# Patient Record
Sex: FEMALE | Race: BLACK OR AFRICAN AMERICAN | NOT HISPANIC OR LATINO | Employment: UNEMPLOYED | URBAN - METROPOLITAN AREA
[De-identification: names, ages, dates, MRNs, and addresses within clinical notes are randomized per-mention and may not be internally consistent; named-entity substitution may affect disease eponyms.]

---

## 2022-09-06 ENCOUNTER — OFFICE VISIT (OUTPATIENT)
Dept: URGENT CARE | Facility: CLINIC | Age: 42
End: 2022-09-06
Payer: MEDICAID

## 2022-09-06 VITALS
BODY MASS INDEX: 24.77 KG/M2 | WEIGHT: 118 LBS | SYSTOLIC BLOOD PRESSURE: 121 MMHG | DIASTOLIC BLOOD PRESSURE: 84 MMHG | RESPIRATION RATE: 18 BRPM | TEMPERATURE: 98 F | OXYGEN SATURATION: 99 % | HEIGHT: 58 IN | HEART RATE: 68 BPM

## 2022-09-06 DIAGNOSIS — J06.9 URI, ACUTE: ICD-10-CM

## 2022-09-06 DIAGNOSIS — R05.9 COUGH: Primary | ICD-10-CM

## 2022-09-06 DIAGNOSIS — R11.0 NAUSEA: ICD-10-CM

## 2022-09-06 LAB
CTP QC/QA: YES
SARS-COV-2 RDRP RESP QL NAA+PROBE: NEGATIVE

## 2022-09-06 PROCEDURE — U0002 COVID-19 LAB TEST NON-CDC: HCPCS | Mod: QW,S$GLB,, | Performed by: FAMILY MEDICINE

## 2022-09-06 PROCEDURE — U0002: ICD-10-PCS | Mod: QW,S$GLB,, | Performed by: FAMILY MEDICINE

## 2022-09-06 PROCEDURE — 99203 OFFICE O/P NEW LOW 30 MIN: CPT | Mod: S$GLB,,, | Performed by: FAMILY MEDICINE

## 2022-09-06 PROCEDURE — 99203 PR OFFICE/OUTPT VISIT, NEW, LEVL III, 30-44 MIN: ICD-10-PCS | Mod: S$GLB,,, | Performed by: FAMILY MEDICINE

## 2022-09-06 RX ORDER — LORATADINE 10 MG/1
10 TABLET ORAL DAILY
Qty: 30 TABLET | Refills: 2 | Status: SHIPPED | OUTPATIENT
Start: 2022-09-06 | End: 2023-06-15

## 2022-09-06 NOTE — PROGRESS NOTES
"Subjective:       Patient ID: Yesenia Sanches is a 41 y.o. female.    Vitals:  height is 4' 10" (1.473 m) and weight is 53.5 kg (118 lb). Her temperature is 98.1 °F (36.7 °C). Her blood pressure is 121/84 and her pulse is 68. Her respiration is 18 and oxygen saturation is 99%.     Chief Complaint: Cough    Patient presents to the clinic with nausea , cough, headache x a few days  Denies fever, but feels ache,  Partially vaccinated  Mild cough  Works at Day care      Cough  This is a new problem. The current episode started in the past 7 days. The problem has been gradually worsening. The cough is Productive of sputum. Associated symptoms include nasal congestion, postnasal drip, a sore throat and sweats. Treatments tried: halls, mucinex, tylenol. The treatment provided mild relief.     HENT:  Positive for postnasal drip and sore throat.    Respiratory:  Positive for cough.      Objective:      Physical Exam   Constitutional: She is oriented to person, place, and time. She appears well-developed. She is cooperative.  Non-toxic appearance. She does not appear ill. No distress.   HENT:   Head: Normocephalic and atraumatic.   Ears:   Right Ear: Hearing, tympanic membrane, external ear and ear canal normal.   Left Ear: Hearing, tympanic membrane, external ear and ear canal normal.   Nose: Nose normal. No mucosal edema, rhinorrhea or nasal deformity. No epistaxis. Right sinus exhibits no maxillary sinus tenderness and no frontal sinus tenderness. Left sinus exhibits no maxillary sinus tenderness and no frontal sinus tenderness.   Mouth/Throat: Uvula is midline and mucous membranes are normal. Mucous membranes are moist. No trismus in the jaw. Normal dentition. No uvula swelling. Posterior oropharyngeal erythema present. No oropharyngeal exudate. Oropharynx is clear.   Eyes: Conjunctivae and lids are normal. Pupils are equal, round, and reactive to light. Right eye exhibits no discharge. Left eye exhibits no discharge. " No scleral icterus. Extraocular movement intact   Neck: Trachea normal and phonation normal. Neck supple.   Cardiovascular: Normal rate, regular rhythm, normal heart sounds and normal pulses.   Pulmonary/Chest: Effort normal and breath sounds normal. No respiratory distress.   Abdominal: Normal appearance and bowel sounds are normal. She exhibits no distension and no mass. Soft. There is no abdominal tenderness.   Musculoskeletal: Normal range of motion.         General: No deformity. Normal range of motion.   Neurological: She is alert and oriented to person, place, and time. She exhibits normal muscle tone. Coordination normal.   Skin: Skin is warm, dry, intact, not diaphoretic and not pale.   Psychiatric: Her speech is normal and behavior is normal. Judgment and thought content normal.   Nursing note and vitals reviewed.      Assessment:       1. Cough    2. Nausea    3. URI, acute            Plan:         Cough  -     POCT COVID-19 Rapid Screening    Nausea    URI, acute    Other orders  -     loratadine (CLARITIN) 10 mg tablet; Take 1 tablet (10 mg total) by mouth once daily.  Dispense: 30 tablet; Refill: 2              Results for orders placed or performed in visit on 09/06/22   POCT COVID-19 Rapid Screening   Result Value Ref Range    POC Rapid COVID Negative Negative     Acceptable Yes

## 2022-09-06 NOTE — LETTER
September 6, 2022      [facility]  [address] [address]  Phone: [phone]  Fax: [phone]       Patient: eYsenia Sanches   YOB: 1980  Date of Visit: 09/06/2022    To Whom It May Concern:    DAVI Sanches  was at [facility] on 09/06/2022. The patient may return to work/school on 9/7/22   with no restrictions. If you have any questions or concerns, or if I can be of further assistance, please do not hesitate to contact me.    Sincerely,    Ty Greer MD

## 2023-03-23 ENCOUNTER — HOSPITAL ENCOUNTER (EMERGENCY)
Facility: HOSPITAL | Age: 43
Discharge: HOME | End: 2023-03-23
Attending: EMERGENCY MEDICINE
Payer: MEDICAID

## 2023-03-23 VITALS
SYSTOLIC BLOOD PRESSURE: 126 MMHG | WEIGHT: 130 LBS | OXYGEN SATURATION: 100 % | BODY MASS INDEX: 27.17 KG/M2 | RESPIRATION RATE: 16 BRPM | TEMPERATURE: 98 F | DIASTOLIC BLOOD PRESSURE: 82 MMHG | HEART RATE: 88 BPM

## 2023-03-23 DIAGNOSIS — N63.41 SUBAREOLAR MASS OF RIGHT BREAST: Primary | ICD-10-CM

## 2023-03-23 PROCEDURE — 99283 PR EMERGENCY DEPT VISIT,LEVEL III: ICD-10-PCS | Mod: ,,, | Performed by: EMERGENCY MEDICINE

## 2023-03-23 PROCEDURE — 99283 EMERGENCY DEPT VISIT LOW MDM: CPT | Mod: ,,, | Performed by: EMERGENCY MEDICINE

## 2023-03-23 PROCEDURE — 99281 EMR DPT VST MAYX REQ PHY/QHP: CPT

## 2023-03-23 NOTE — ED TRIAGE NOTES
"Pt arriving to ED with c/o right breast tenderness since Monday, when showering, felt a lump. Had mammogram 1.5 years ago, had "ring put in to watch a benign mass". States does not get tenderness with menstruation. Denies breastfeeding.   "

## 2023-03-23 NOTE — ED PROVIDER NOTES
"Encounter Date: 3/23/2023    SCRIBE #1 NOTE: I, Marilu Caicedo, am scribing for, and in the presence of,  Kamlesh Brock MD. I have scribed the following portions of the note - Other sections scribed: HPI, ROS, PE.   STAFF ATTENDING PHYSICIAN NOTE:  I provided and agree with the documentation provided by KEVIN on Yesenia Sanches.  ____________________  Satish Brock MD, [facility]  Emergency Medicine  History     Chief Complaint   Patient presents with    Breast Pain     Right side pain, when showering, felt a lump. Had mammogram 1.5 years ago, had "ring put in to watch a benign mass"       Time patient was seen by the provider: 11:30 AM      The patient is a 42 y.o. female with no significant past medical history who presents to the ED with a complaint of intermittent right breast tenderness today. Pt reports her pain is reproducible with touch and aggravated with movement. 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 Pt has had a mammogram approximately a year ago and was found to have a right breast mass. She has since been biopsied for this at OSH. She is currently being followed for this. However the pt presents anxious and concerned as she suspects this is worsening. Denies galactorrhea or sanguineous discharge. Pt does not have a FHx of breast cancer.    The history is provided by medical records and the patient. No  was used.   Review of patient's allergies indicates:  No Known Allergies  History reviewed. No pertinent past medical history.  History reviewed. No pertinent surgical history.  History reviewed. No pertinent family history.  Social History     Tobacco Use    Smoking status: Never    Smokeless tobacco: Never   Substance Use Topics    Alcohol use: No    Drug use: No     Review of Systems   Constitutional:  Negative for fever.   HENT:  Negative for sore throat.    Eyes:  Negative for visual disturbance.   Respiratory:  Negative for cough and shortness of " breath.    Cardiovascular:  Negative for chest pain.   Gastrointestinal:  Negative for abdominal pain, diarrhea, nausea and vomiting.   Genitourinary:  Negative for dysuria.   Musculoskeletal:  Negative for neck pain.        + R breast tenderness   Skin:  Negative for rash and wound.   Allergic/Immunologic: Negative for immunocompromised state.   Neurological:  Negative for syncope.   Psychiatric/Behavioral:  Negative for confusion. The patient is nervous/anxious.      Physical Exam     Initial Vitals [03/23/23 1013]   BP Pulse Resp Temp SpO2   126/82 101 16 98.4 °F (36.9 °C) 100 %      MAP       --         Physical Exam    Nursing note and vitals reviewed.    GENERAL: Calm; Cooperative; Well-appearing and Non-Toxic; Well-Nourished; NAD.  HEENT: AT/NC; anicteric sclera; speaking full sentences with no slurring of speech or drooling/inability to tolerate oral secretions.  NECK: Supple, FROM with no meningismus, no accessory muscle use.   THORAX/BACK: Atraumatic with NTTP. No midline TTP to C/T/LS spine; no nipple discharge, areolar retraction with right breast subareolar rubber inconsistency and slightly smaller than golf ball size mass that is not adhere to the anterior chest wall and easily movable.  HEART: Regular rate and rhythm, no M/G/T.  LUNGS: No Tachypnea, No Retractions, and CTA B/L with no W/R/R.  ABDOMEN: Soft, ND, NTTP. No rigidity. No guarding. NEG Andrew's, Rovsing's, or McBurney's point tenderness.  EXTREMITIES/LYMPHADENOPATHY: FROM.  No appreciated axillary or supraclavicular lymphadenopathy bilaterally.  SKIN: Warm, Dry, No Skin Tears or Rashes.  VASCULAR: 2+ pulses Prox/Dist & Symmetrical with No delay.  NEUROLOGIC: AAOx3; answering questions appropriately with no focal deficits    ED Course   Procedures  Labs Reviewed - No data to display       Imaging Results    None          Medications - No data to display  Medical Decision Making:   History:   Old Medical Records: I decided to obtain old  medical records.  Initial Assessment:   Afebrile, atraumatic and hemodynamically stable female presents with right subareolar mass warranting outpatient sonography follow-up.  Acute ED interventions warranted at this time.  No signs that would indicate acute infection that may need antibiotic treatment.        Scribe Attestation:   Scribe #1: I performed the above scribed service and the documentation accurately describes the services I performed. I attest to the accuracy of the note.                   Clinical Impression:   Final diagnoses:  [N63.41] Subareolar mass of right breast (Primary)        ED Disposition Condition    Discharge Stable          ED Prescriptions    None       Follow-up Information    None          Kamlesh Brock MD  04/01/23 [id number]

## 2023-03-31 ENCOUNTER — OFFICE VISIT (OUTPATIENT)
Dept: URGENT CARE | Facility: CLINIC | Age: 43
End: 2023-03-31
Payer: MEDICAID

## 2023-03-31 VITALS
BODY MASS INDEX: 27.17 KG/M2 | TEMPERATURE: 98 F | SYSTOLIC BLOOD PRESSURE: 122 MMHG | WEIGHT: 130 LBS | DIASTOLIC BLOOD PRESSURE: 83 MMHG | OXYGEN SATURATION: 96 % | RESPIRATION RATE: 20 BRPM | HEART RATE: 75 BPM

## 2023-03-31 DIAGNOSIS — R00.2 PALPITATIONS: Primary | ICD-10-CM

## 2023-03-31 PROCEDURE — 93010 ELECTROCARDIOGRAM REPORT: CPT | Mod: S$PBB,,, | Performed by: INTERNAL MEDICINE

## 2023-03-31 PROCEDURE — 93010 EKG 12-LEAD: ICD-10-PCS | Mod: S$PBB,,, | Performed by: INTERNAL MEDICINE

## 2023-03-31 PROCEDURE — 99213 OFFICE O/P EST LOW 20 MIN: CPT | Mod: S$GLB,,, | Performed by: NURSE PRACTITIONER

## 2023-03-31 PROCEDURE — 99213 PR OFFICE/OUTPT VISIT, EST, LEVL III, 20-29 MIN: ICD-10-PCS | Mod: S$GLB,,, | Performed by: NURSE PRACTITIONER

## 2023-03-31 PROCEDURE — 93005 EKG 12-LEAD: ICD-10-PCS | Mod: S$GLB,,, | Performed by: NURSE PRACTITIONER

## 2023-03-31 PROCEDURE — 93005 ELECTROCARDIOGRAM TRACING: CPT | Mod: S$GLB,,, | Performed by: NURSE PRACTITIONER

## 2023-03-31 NOTE — LETTER
March 31, 2023      [facility] - [place]  [address]  [facility] [address]  Phone: [phone]  Fax: [phone]       Patient: Yesenia Sanches   YOB: 1980  Date of Visit: 03/31/2023    To Whom It May Concern:    DAVI Sanches  was at [facility] on 03/31/2023. The patient may return to work/school on 04/04/2023 with no restrictions. If you have any questions or concerns, or if I can be of further assistance, please do not hesitate to contact me.    Sincerely,    Magno Fountain MA

## 2023-03-31 NOTE — PROGRESS NOTES
"Subjective:      Patient ID: Yesenia Sanches is a 42 y.o. female.    Vitals:  weight is 59 kg (130 lb). Her temperature is 98.3 °F (36.8 °C). Her blood pressure is 122/83 and her pulse is 75. Her respiration is 20 and oxygen saturation is 96%.     Chief Complaint: Tachycardia    Pt is complaining of heart racing that started last night. It kept her up for a good bit of the night but she was able to fall asleep.  She had another episode in the day but resolved. No accompanying pedal edema, chest pain, SOB, orthopnea.  Does feel like she was breathing heavy at the time of palpitations. Also has some tingling in the extremities that resolved spontaneouslyIn the am, she felt a little dizzy, a little "off" but no LOC, syncope.  No hx of card or pulm pathologies. Denies new meds. No caffeine intake  Review or EHR , mammogram done Jan 2021, which noted a subareolar mass in the R breast. Core biopsy done and noted that it was a benign fibroadenoma  Seen by OB 3/27, repeat mammo and US breast showed chronic breast mass with changes with recommendation for repeat bx    She said she took aspirin because she thought she was having a heart attack. She is just worried because she feels her hurt pulsating.       Palpitations   This is a new problem. The current episode started today. Nothing aggravates the symptoms. Associated symptoms include dizziness and nausea. Pertinent negatives include no chest fullness, chest pain, coughing, diaphoresis, fever, irregular heartbeat, near-syncope, numbness, shortness of breath, syncope, vomiting or weakness. There are no known risk factors.     Constitution: Negative for chills, sweating, fatigue and fever.   HENT:  Negative for ear pain, ear discharge, tinnitus, hearing loss, congestion, sinus pain, sinus pressure, sore throat, trouble swallowing and voice change.    Neck: Negative for neck pain and painful lymph nodes.   Cardiovascular:  Positive for palpitations. Negative for chest " pain, leg swelling, sob on exertion and passing out.   Respiratory:  Negative for chest tightness, cough, sputum production, shortness of breath and wheezing.    Gastrointestinal:  Positive for nausea. Negative for abdominal pain, vomiting, constipation, diarrhea, bright red blood in stool, dark colored stools and rectal bleeding.   Genitourinary:  Negative for dysuria, frequency, urgency, flank pain, hematuria, vaginal pain, vaginal discharge, vaginal bleeding, vaginal odor, genital sore and pelvic pain.   Musculoskeletal:  Negative for muscle ache.   Skin:  Negative for color change, pale, rash and erythema.   Allergic/Immunologic: Negative for sneezing.   Neurological:  Positive for dizziness. Negative for headaches, numbness and tingling.   Hematologic/Lymphatic: Negative for swollen lymph nodes.    Objective:     Physical Exam   Constitutional: She is oriented to person, place, and time. She appears well-developed. She is cooperative.  Non-toxic appearance. She does not appear ill. No distress.   HENT:   Head: Normocephalic and atraumatic.   Ears:   Right Ear: Hearing and external ear normal.   Left Ear: Hearing and external ear normal.   Nose: Nose normal. No mucosal edema, rhinorrhea, nasal deformity or congestion. No epistaxis. Right sinus exhibits no maxillary sinus tenderness and no frontal sinus tenderness. Left sinus exhibits no maxillary sinus tenderness and no frontal sinus tenderness.   Mouth/Throat: Uvula is midline, oropharynx is clear and moist and mucous membranes are normal. No trismus in the jaw. Normal dentition. No uvula swelling. No posterior oropharyngeal edema.   Eyes: Conjunctivae and lids are normal. Right eye exhibits no discharge. No scleral icterus.   Neck: Trachea normal and phonation normal. Neck supple. Carotid bruit is not present. No edema present. No erythema present. No neck rigidity present.   Cardiovascular: Normal rate, regular rhythm, normal heart sounds and normal pulses.    No murmur heard.  Pulmonary/Chest: Effort normal and breath sounds normal. No stridor. No respiratory distress. She has no decreased breath sounds. She has no wheezes. She has no rhonchi. She has no rales. She exhibits no tenderness.   Abdominal: Normal appearance and bowel sounds are normal. She exhibits no distension, no abdominal bruit, no pulsatile midline mass and no mass. Soft. flat abdomen There is no abdominal tenderness. There is no rebound and no guarding.   Musculoskeletal: Normal range of motion.         General: No swelling, tenderness, deformity or signs of injury. Normal range of motion.      Cervical back: She exhibits no tenderness.      Right lower leg: No edema.      Left lower leg: No edema.   Lymphadenopathy:     She has no cervical adenopathy.   Neurological: no focal deficit. She is alert and oriented to person, place, and time. She has normal strength and normal reflexes. She displays no weakness and normal reflexes. No sensory deficit. She exhibits normal muscle tone. Coordination and gait normal.   Skin: Skin is warm, dry, intact, not diaphoretic, not pale and no rash. Capillary refill takes 2 to 3 seconds. No bruising and No erythema   Psychiatric: Her speech is normal and behavior is normal. Judgment and thought content normal.   Nursing note and vitals reviewed.    EKG - NSR  Assessment:     1. Palpitations      Differentials include but not limited to anxiety, cardiac arrhythmia, dehydration, anemia, metabolic d/o.  Patient VSS and at baseline, currently symptom free. Recommend avoidance of caffeine, follow heart healthy lifestyle, stress reduction techniques and f/u with PCP for further evaluation and management. ER precaution discussed.   Plan:       Palpitations  -     IN OFFICE EKG 12-LEAD (to [place])                  Patient Instructions   See additional patient Instructions provided    Patient Instructions   - You must understand that you have received an Urgent Care treatment  only and that you may be released before all of your medical problems are known or treated.   - You, the patient, will arrange for follow up care as instructed.   - If your condition worsens or fails to improve we recommend that you receive another evaluation at the ER immediately or contact your PCP to discuss your concerns or return here.     Advised on return/follow-up precautions. Advised on ER precautions. Answered all patient questions. Patient verbalized understanding and voiced agreement with current treatment plan.

## 2023-06-12 ENCOUNTER — HOSPITAL ENCOUNTER (EMERGENCY)
Facility: HOSPITAL | Age: 43
Discharge: HOME | End: 2023-06-13
Attending: EMERGENCY MEDICINE
Payer: MEDICAID

## 2023-06-12 DIAGNOSIS — R00.2 PALPITATIONS: Primary | ICD-10-CM

## 2023-06-12 LAB
B-HCG UR QL: NEGATIVE
CTP QC/QA: YES

## 2023-06-12 PROCEDURE — 99284 EMERGENCY DEPT VISIT MOD MDM: CPT | Mod: ,,, | Performed by: EMERGENCY MEDICINE

## 2023-06-12 PROCEDURE — 99284 PR EMERGENCY DEPT VISIT,LEVEL IV: ICD-10-PCS | Mod: ,,, | Performed by: EMERGENCY MEDICINE

## 2023-06-12 PROCEDURE — 93005 ELECTROCARDIOGRAM TRACING: CPT

## 2023-06-12 PROCEDURE — 93010 EKG 12-LEAD: ICD-10-PCS | Mod: ,,, | Performed by: INTERNAL MEDICINE

## 2023-06-12 PROCEDURE — 93010 ELECTROCARDIOGRAM REPORT: CPT | Mod: ,,, | Performed by: INTERNAL MEDICINE

## 2023-06-12 PROCEDURE — 99284 EMERGENCY DEPT VISIT MOD MDM: CPT

## 2023-06-12 PROCEDURE — 81025 URINE PREGNANCY TEST: CPT | Performed by: PHYSICIAN ASSISTANT

## 2023-06-12 RX ORDER — IBUPROFEN 200 MG
400 TABLET ORAL DAILY PRN
COMMUNITY

## 2023-06-12 RX ORDER — OMEPRAZOLE 20 MG/1
20 CAPSULE, DELAYED RELEASE ORAL DAILY PRN
COMMUNITY

## 2023-06-12 RX ORDER — LANOLIN ALCOHOL/MO/W.PET/CERES
1000 CREAM (GRAM) TOPICAL
COMMUNITY

## 2023-06-12 NOTE — Clinical Note
"Cyn BARNETTSATNAM aSnches was seen and treated in our emergency department on 6/12/2023.  She may return to work on 06/15/2023.       If you have any questions or concerns, please don't hesitate to call.      RONAL Pedraza RN RN    "

## 2023-06-12 NOTE — Clinical Note
"Cyn BARNETTSATNAM Sanches was seen and treated in our emergency department on 6/12/2023.  She may return to work on 06/15/2023.       If you have any questions or concerns, please don't hesitate to call.      RONAL Pedraza RN RN    "

## 2023-06-13 VITALS
DIASTOLIC BLOOD PRESSURE: 82 MMHG | BODY MASS INDEX: 25.89 KG/M2 | HEART RATE: 70 BPM | TEMPERATURE: 98 F | SYSTOLIC BLOOD PRESSURE: 130 MMHG | RESPIRATION RATE: 18 BRPM | OXYGEN SATURATION: 100 % | WEIGHT: 120 LBS | HEIGHT: 57 IN

## 2023-06-13 LAB
ALBUMIN SERPL BCP-MCNC: 3.8 G/DL (ref 3.5–5.2)
ALP SERPL-CCNC: 56 U/L (ref 55–135)
ALT SERPL W/O P-5'-P-CCNC: 8 U/L (ref 10–44)
ANION GAP SERPL CALC-SCNC: 6 MMOL/L (ref 8–16)
AST SERPL-CCNC: 11 U/L (ref 10–40)
BASOPHILS # BLD AUTO: 0.03 K/UL (ref 0–0.2)
BASOPHILS NFR BLD: 0.5 % (ref 0–1.9)
BILIRUB SERPL-MCNC: 0.8 MG/DL (ref 0.1–1)
BUN SERPL-MCNC: 13 MG/DL (ref 6–20)
BUN SERPL-MCNC: 15 MG/DL (ref 6–30)
CALCIUM SERPL-MCNC: 8.9 MG/DL (ref 8.7–10.5)
CHLORIDE SERPL-SCNC: 101 MMOL/L (ref 95–110)
CHLORIDE SERPL-SCNC: 106 MMOL/L (ref 95–110)
CO2 SERPL-SCNC: 27 MMOL/L (ref 23–29)
CREAT SERPL-MCNC: 0.6 MG/DL (ref 0.5–1.4)
CREAT SERPL-MCNC: 0.7 MG/DL (ref 0.5–1.4)
DIFFERENTIAL METHOD: ABNORMAL
EOSINOPHIL # BLD AUTO: 0.1 K/UL (ref 0–0.5)
EOSINOPHIL NFR BLD: 1.3 % (ref 0–8)
ERYTHROCYTE [DISTWIDTH] IN BLOOD BY AUTOMATED COUNT: 15.6 % (ref 11.5–14.5)
EST. GFR  (NO RACE VARIABLE): >60 ML/MIN/1.73 M^2
GLUCOSE SERPL-MCNC: 93 MG/DL (ref 70–110)
GLUCOSE SERPL-MCNC: 98 MG/DL (ref 70–110)
HCT VFR BLD AUTO: 36.4 % (ref 37–48.5)
HCT VFR BLD CALC: 39 %PCV (ref 36–54)
HCV AB SERPL QL IA: NORMAL
HGB BLD-MCNC: 11.2 G/DL (ref 12–16)
HIV 1+2 AB+HIV1 P24 AG SERPL QL IA: NORMAL
IMM GRANULOCYTES # BLD AUTO: 0.01 K/UL (ref 0–0.04)
IMM GRANULOCYTES NFR BLD AUTO: 0.2 % (ref 0–0.5)
LYMPHOCYTES # BLD AUTO: 3.2 K/UL (ref 1–4.8)
LYMPHOCYTES NFR BLD: 49.8 % (ref 18–48)
MAGNESIUM SERPL-MCNC: 1.8 MG/DL (ref 1.6–2.6)
MCH RBC QN AUTO: 21.4 PG (ref 27–31)
MCHC RBC AUTO-ENTMCNC: 30.8 G/DL (ref 32–36)
MCV RBC AUTO: 70 FL (ref 82–98)
MONOCYTES # BLD AUTO: 0.8 K/UL (ref 0.3–1)
MONOCYTES NFR BLD: 12.8 % (ref 4–15)
NEUTROPHILS # BLD AUTO: 2.3 K/UL (ref 1.8–7.7)
NEUTROPHILS NFR BLD: 35.4 % (ref 38–73)
NRBC BLD-RTO: 0 /100 WBC
PLATELET # BLD AUTO: 286 K/UL (ref 150–450)
PMV BLD AUTO: 10 FL (ref 9.2–12.9)
POC IONIZED CALCIUM: 1.24 MMOL/L (ref 1.06–1.42)
POC TCO2 (MEASURED): 28 MMOL/L (ref 23–29)
POTASSIUM BLD-SCNC: 3.9 MMOL/L (ref 3.5–5.1)
POTASSIUM SERPL-SCNC: 4 MMOL/L (ref 3.5–5.1)
PROT SERPL-MCNC: 6.7 G/DL (ref 6–8.4)
RBC # BLD AUTO: 5.23 M/UL (ref 4–5.4)
SAMPLE: NORMAL
SARS-COV-2 RDRP RESP QL NAA+PROBE: NEGATIVE
SODIUM BLD-SCNC: 138 MMOL/L (ref 136–145)
SODIUM SERPL-SCNC: 139 MMOL/L (ref 136–145)
TSH SERPL DL<=0.005 MIU/L-ACNC: 1.15 UIU/ML (ref 0.4–4)
WBC # BLD AUTO: 6.35 K/UL (ref 3.9–12.7)

## 2023-06-13 PROCEDURE — 80047 BASIC METABLC PNL IONIZED CA: CPT

## 2023-06-13 PROCEDURE — 86803 HEPATITIS C AB TEST: CPT | Performed by: EMERGENCY MEDICINE

## 2023-06-13 PROCEDURE — 87389 HIV-1 AG W/HIV-1&-2 AB AG IA: CPT | Performed by: EMERGENCY MEDICINE

## 2023-06-13 PROCEDURE — 83735 ASSAY OF MAGNESIUM: CPT | Performed by: PHYSICIAN ASSISTANT

## 2023-06-13 PROCEDURE — U0002 COVID-19 LAB TEST NON-CDC: HCPCS | Performed by: PHYSICIAN ASSISTANT

## 2023-06-13 PROCEDURE — 84443 ASSAY THYROID STIM HORMONE: CPT | Performed by: PHYSICIAN ASSISTANT

## 2023-06-13 PROCEDURE — 80053 COMPREHEN METABOLIC PANEL: CPT | Performed by: PHYSICIAN ASSISTANT

## 2023-06-13 PROCEDURE — 85025 COMPLETE CBC W/AUTO DIFF WBC: CPT | Performed by: PHYSICIAN ASSISTANT

## 2023-06-13 NOTE — PROVIDER PROGRESS NOTES - EMERGENCY DEPT.
Encounter Date: 6/12/2023    ED Physician Progress Notes         EKG - STEMI Decision  Initial Reading: No STEMI present.  Response: No Action Needed.

## 2023-06-13 NOTE — PROVIDER PROGRESS NOTES - EMERGENCY DEPT.
Encounter Date: 6/12/2023    ED Physician Progress Notes        Received sign-out from previous team plan was follow-up blood work and reassess.  Labs reviewed.  Workup greatly reassuring no significant electrolyte abnormality EKG normal sinus rhythm TSH within normal limits.  Discussed with patient diagnosis of palpitations.  She will be discharged with referral for Cardiology Holter monitor.  Patient does endorse she is under lot of stress could be stress-induced versus underlying arrhythmia.  Will plan discharge home return precautions discussed.

## 2023-06-13 NOTE — ED PROVIDER NOTES
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  She reports sensation of skipped heartbeats with associated fatigue. Her symptoms seem worse after eating.  She denies chest pain, shortness of breath, abdominal pain, nausea/vomiting, hot or cold intolerance, leg pain or swelling or prior similar episodes.  No history DVT/PE, no recent surgeries or immobilization.  She did have a recent breast biopsy for a tick focal ductal hyperplasia and reports that the path was negative for cancer. Not on OCPs.    Review of patient's allergies indicates:  No Known Allergies  History reviewed. No pertinent past medical history.  History reviewed. No pertinent surgical history.  History reviewed. No pertinent family history.  Social History     Tobacco Use    Smoking status: Never    Smokeless tobacco: Never   Substance Use Topics    Alcohol use: No    Drug use: No     Review of Systems   Constitutional:  Positive for fatigue. Negative for fever.   Respiratory:  Negative for shortness of breath.    Cardiovascular:  Positive for palpitations. Negative for chest pain and leg swelling.   Gastrointestinal:  Negative for abdominal pain, diarrhea, nausea and vomiting.   Endocrine: Negative for cold intolerance and heat intolerance.   Allergic/Immunologic: Negative for immunocompromised state.   Psychiatric/Behavioral:  The patient is not nervous/anxious.      Physical Exam     Initial Vitals [06/12/23 2237]   BP Pulse Resp Temp SpO2   131/81 72 18 98.7 °F (37.1 °C) 100 %      MAP       --         Physical Exam    Nursing note and vitals reviewed.  Constitutional: She appears well-developed and well-nourished.   HENT:   Head: Normocephalic and atraumatic.   Eyes:   Pale conjunctivae   Neck: Neck  supple.   Normal range of motion.  Cardiovascular:  Normal rate, regular rhythm, normal heart sounds and intact distal pulses.     Exam reveals no gallop and no friction rub.       No murmur heard.  No lower extremity edema, erythema, tenderness or warmth   Pulmonary/Chest: Breath sounds normal. No respiratory distress. She has no wheezes. She has no rhonchi. She has no rales. She exhibits no tenderness.   Musculoskeletal:         General: Normal range of motion.      Cervical back: Normal range of motion and neck supple.     Neurological: She is alert and oriented to person, place, and time.   Skin: Skin is warm and dry.   Psychiatric: She has a normal mood and affect.       ED Course   Procedures  Labs Reviewed   CBC W/ AUTO DIFFERENTIAL - Abnormal; Notable for the following components:       Result Value    Hemoglobin 11.2 (*)     Hematocrit 36.4 (*)     MCV 70 (*)     MCH 21.4 (*)     MCHC 30.8 (*)     RDW 15.6 (*)     Gran % 35.4 (*)     Lymph % 49.8 (*)     All other components within normal limits   COMPREHENSIVE METABOLIC PANEL   TSH   MAGNESIUM   SARS-COV-2 RNA AMPLIFICATION, QUAL   HIV 1 / 2 ANTIBODY   HEPATITIS C ANTIBODY   POCT URINE PREGNANCY   ISTAT PROCEDURE   ISTAT CHEM8     EKG Readings: (Independently Interpreted)   Initial Reading: No STEMI. Previous EKG: Compared with most recent EKG Previous EKG Date: 3/31/2023. Rhythm: Normal Sinus Rhythm. Heart Rate: 84. Ectopy: No Ectopy. Conduction: Normal. ST Segments: Normal ST Segments. Clinical Impression: Normal Sinus Rhythm Other Impression: Incomplete right bundle branch block, similar compared to prior     Imaging Results    None          Medications - No data to display  Medical Decision Making:   History:   Old Medical Records: I decided to obtain old medical records.  Old Records Summarized: records from clinic visits and records from another hospital.       <> Summary of Records: Seen for follow-up after her breast biopsy 05/10/2023.  Pathology  consistent with atypical ductal hyperplasia.  Initial Assessment:   42-year-old female presenting for intermittent palpitations for 3 days.  Her vitals are normal and she is well-appearing.  Differential Diagnosis:   Dysrhythmia   Anemia  Electrolyte derangement  Thyroid dysfunction   She has no chest pain, shortness of breath or lightheadedness.  I do not think her presentation is consistent with ACS.  I did consider pulmonary embolism but I do not think her presentation is consistent with this.  She has no chest pain, shortness of breath or syncope and is PERC negative  Independently Interpreted Test(s):   I have ordered and independently interpreted EKG Reading(s) - see prior notes  Clinical Tests:   Lab Tests: Ordered and Reviewed  Medical Tests: Ordered and Reviewed  ED Management:  Check labs, EKG, place on cardiac monitor and reassess.    Dispo pending results of lab workup.  I am signing out to Matilda Helms MD.    1:08 AM  Diane Fonseca PA-C    Additional MDM:   PERC Rule:   Age is greater than or equal to 50 = 0.0  Heart Rate is greater than or equal to 100 = 0.0  SaO2 on room air < 95% = 0.0  Unilateral leg swelling = 0.0  Hemoptysis = 0.0  Recent surgery or trauma = 0.0  Prior PE or DVT =  0.0  Hormone use = 0.00  PERC Score = 0        ED Course as of 06/13/23 0109 Mon Jun 12, 2023   2354 Preg Test, Ur: Negative [CC]   Tue Jun 13, 2023   0041 Hemoglobin(!): 11.2 [CC]      ED Course User Index  [CC] Diane Fonseca PA-C                 Clinical Impression:   Final diagnoses:  [R00.2] Palpitations (Primary)               Diane Fonseca PA-C  06/13/23 0109

## 2023-06-13 NOTE — ED NOTES
LOC: The patient is awake, alert, and oriented to self, place, time, and situation. Pt is calm and cooperative. Affect is appropriate.  Speech is appropriate and clear.     APPEARANCE: Patient resting comfortably in no acute distress.  Patient is clean and well groomed.    SKIN: The skin is warm and dry; color consistent with ethnicity.  Patient has normal skin turgor and moist mucus membranes.  Skin intact; no breakdown or bruising noted.     MUSCULOSKELETAL: Patient moving upper and lower extremities without difficulty; denies pain in the extremities or back.  Denies weakness.     RESPIRATORY: Airway is open and patent. Respirations spontaneous, even, easy, and non-labored.  No audible wheeze. Patient has a normal effort and rate.  No accessory muscle use noted. Denies cough.     CARDIAC:  Normal rate noted.  No peripheral edema noted.  No complaints of chest pain.      ABDOMEN:    No distention noted. Pt denies abdominal pain; denies nausea, vomiting, diarrhea, or constipation.    NEUROLOGIC: Eyes open spontaneously.  Behavior appropriate to situation.  Follows commands; facial expression symmetrical.  Purposeful motor response noted; normal sensation in all extremities. Pt denies headache; denies lightheadedness or dizziness; denies visual disturbances; denies loss of balance; denies unilateral weakness.

## 2023-06-13 NOTE — ED TRIAGE NOTES
Yesenia SARWAT Sanches, a 42 y.o. female presents to the ED w/ complaint of palpitations. Reports this has been happening for weeks but it's getting worse and she would like to be checked out. Reports it feels like her heart stops for a second and then starts beating again. This happens intermittently throughout the day. Denies fever, chest pain, shortness of breath, nausea, vomiting.    Triage note:  Chief Complaint   Patient presents with    Palpitations     States heart feels like its skipping a beat intermittently x 3 days     Review of patient's allergies indicates:  No Known Allergies  History reviewed. No pertinent past medical history.

## 2023-06-15 ENCOUNTER — OFFICE VISIT (OUTPATIENT)
Dept: CARDIOLOGY | Facility: CLINIC | Age: 43
End: 2023-06-15
Payer: MEDICAID

## 2023-06-15 VITALS
HEART RATE: 67 BPM | DIASTOLIC BLOOD PRESSURE: 80 MMHG | BODY MASS INDEX: 27.59 KG/M2 | WEIGHT: 127.88 LBS | OXYGEN SATURATION: 99 % | SYSTOLIC BLOOD PRESSURE: 124 MMHG | HEIGHT: 57 IN

## 2023-06-15 DIAGNOSIS — R00.2 PALPITATIONS: ICD-10-CM

## 2023-06-15 DIAGNOSIS — R94.31 NONSPECIFIC ABNORMAL ELECTROCARDIOGRAM (ECG) (EKG): ICD-10-CM

## 2023-06-15 PROCEDURE — 3079F DIAST BP 80-89 MM HG: CPT | Mod: CPTII,,, | Performed by: INTERNAL MEDICINE

## 2023-06-15 PROCEDURE — 1160F RVW MEDS BY RX/DR IN RCRD: CPT | Mod: CPTII,,, | Performed by: INTERNAL MEDICINE

## 2023-06-15 PROCEDURE — 3079F PR MOST RECENT DIASTOLIC BLOOD PRESSURE 80-89 MM HG: ICD-10-PCS | Mod: CPTII,,, | Performed by: INTERNAL MEDICINE

## 2023-06-15 PROCEDURE — 3008F PR BODY MASS INDEX (BMI) DOCUMENTED: ICD-10-PCS | Mod: CPTII,,, | Performed by: INTERNAL MEDICINE

## 2023-06-15 PROCEDURE — 99203 PR OFFICE/OUTPT VISIT, NEW, LEVL III, 30-44 MIN: ICD-10-PCS | Mod: S$PBB,,, | Performed by: INTERNAL MEDICINE

## 2023-06-15 PROCEDURE — 3008F BODY MASS INDEX DOCD: CPT | Mod: CPTII,,, | Performed by: INTERNAL MEDICINE

## 2023-06-15 PROCEDURE — 99999 PR PBB SHADOW E&M-EST. PATIENT-LVL III: ICD-10-PCS | Mod: PBBFAC,,, | Performed by: INTERNAL MEDICINE

## 2023-06-15 PROCEDURE — 99999 PR PBB SHADOW E&M-EST. PATIENT-LVL III: CPT | Mod: PBBFAC,,, | Performed by: INTERNAL MEDICINE

## 2023-06-15 PROCEDURE — 1160F PR REVIEW ALL MEDS BY PRESCRIBER/CLIN PHARMACIST DOCUMENTED: ICD-10-PCS | Mod: CPTII,,, | Performed by: INTERNAL MEDICINE

## 2023-06-15 PROCEDURE — 1159F PR MEDICATION LIST DOCUMENTED IN MEDICAL RECORD: ICD-10-PCS | Mod: CPTII,,, | Performed by: INTERNAL MEDICINE

## 2023-06-15 PROCEDURE — 99203 OFFICE O/P NEW LOW 30 MIN: CPT | Mod: S$PBB,,, | Performed by: INTERNAL MEDICINE

## 2023-06-15 PROCEDURE — 99213 OFFICE O/P EST LOW 20 MIN: CPT | Mod: PBBFAC,PN | Performed by: INTERNAL MEDICINE

## 2023-06-15 PROCEDURE — 3074F PR MOST RECENT SYSTOLIC BLOOD PRESSURE < 130 MM HG: ICD-10-PCS | Mod: CPTII,,, | Performed by: INTERNAL MEDICINE

## 2023-06-15 PROCEDURE — 1159F MED LIST DOCD IN RCRD: CPT | Mod: CPTII,,, | Performed by: INTERNAL MEDICINE

## 2023-06-15 PROCEDURE — 3074F SYST BP LT 130 MM HG: CPT | Mod: CPTII,,, | Performed by: INTERNAL MEDICINE

## 2023-06-15 NOTE — PROGRESS NOTES
[facility] - Cardiology [address]  Cardiology Clinic Note      Chief Complaint  Chief Complaint   Patient presents with    Palpitations     Started last week, intermittent.  ED 06/12/2023        HPI:    42-year-old female seen recently for palpitations in the emergency room  Labs and EKG reviewed  Patient reports palpitations over the past week occurring intermittently.  She states the palpitations are as if her heart is skipping a beat.  Reports no significant associated cardiovascular symptoms.      Medications  Current Outpatient Medications   Medication Sig Dispense Refill    cyanocobalamin (VITAMIN B-12) 1000 MCG tablet Take 1,000 mcg by mouth.      ibuprofen (ADVIL,MOTRIN) 200 MG tablet Take 400 mg by mouth daily as needed.      omeprazole (PRILOSEC) 20 MG capsule Take 20 mg by mouth daily as needed.       No current facility-administered medications for this visit.        History  No past medical history on file.  No past surgical history on file.  Social History     Socioeconomic History    Marital status: Single   Tobacco Use    Smoking status: Never    Smokeless tobacco: Never   Substance and Sexual Activity    Alcohol use: No    Drug use: No     Family History   Problem Relation Age of Onset    Hypertension Mother     Heart disease Father     Stroke Father     Hypertension Father         Allergies  Review of patient's allergies indicates:  No Known Allergies    Review of Systems   Review of Systems   Constitutional: Negative for fever.   HENT:  Negative for nosebleeds.    Eyes:  Negative for visual disturbance.   Cardiovascular:  Negative for chest pain, claudication, dyspnea on exertion, palpitations and syncope.   Respiratory:  Negative for cough, hemoptysis and wheezing.    Endocrine: Negative for cold intolerance, heat intolerance, polyphagia and polyuria.   Hematologic/Lymphatic: Negative for bleeding problem.   Skin:  Negative for rash.   Musculoskeletal:  Negative for myalgias.   Gastrointestinal:   Negative for hematemesis, hematochezia, nausea and vomiting.   Genitourinary:  Negative for dysuria.   Neurological:  Negative for focal weakness and sensory change.   Psychiatric/Behavioral:  Negative for altered mental status.      Physical Exam  Vitals:    06/15/23 0927   BP: 124/80   Pulse: 67     Wt Readings from Last 1 Encounters:   06/15/23 58 kg (127 lb 13.9 oz)     Physical Exam  Constitutional:       General: She is not in acute distress.  HENT:      Head: Normocephalic and atraumatic.      Mouth/Throat:      Mouth: Mucous membranes are moist.   Eyes:      Extraocular Movements: Extraocular movements intact.      Pupils: Pupils are equal, round, and reactive to light.   Neck:      Vascular: No carotid bruit or JVD.   Cardiovascular:      Rate and Rhythm: Normal rate and regular rhythm.      Heart sounds: No murmur heard.    No friction rub. No gallop.   Pulmonary:      Effort: Pulmonary effort is normal.      Breath sounds: Normal breath sounds.   Abdominal:      Tenderness: There is no abdominal tenderness. There is no guarding or rebound.   Musculoskeletal:      Right lower leg: No edema.      Left lower leg: No edema.   Skin:     General: Skin is warm and dry.      Capillary Refill: Capillary refill takes less than 2 seconds.   Neurological:      General: No focal deficit present.      Mental Status: She is alert.   Psychiatric:         Mood and Affect: Mood normal.       Labs  Admission on 06/12/2023, Discharged on 06/13/2023   Component Date Value Ref Range Status    POC Preg Test, Ur 06/12/2023 Negative  Negative Final     Acceptable 06/12/2023 Yes   Final    WBC 06/13/2023 6.35  3.90 - 12.70 K/uL Final    RBC 06/13/2023 5.23  4.00 - 5.40 M/uL Final    Hemoglobin 06/13/2023 11.2 (L)  12.0 - 16.0 g/dL Final    Hematocrit 06/13/2023 36.4 (L)  37.0 - 48.5 % Final    MCV 06/13/2023 70 (L)  82 - 98 fL Final    MCH 06/13/2023 21.4 (L)  27.0 - 31.0 pg Final    MCHC 06/13/2023 30.8 (L)  32.0 -  36.0 g/dL Final    RDW 06/13/2023 15.6 (H)  11.5 - 14.5 % Final    Platelets 06/13/2023 286  150 - 450 K/uL Final    MPV 06/13/2023 10.0  9.2 - 12.9 fL Final    Immature Granulocytes 06/13/2023 0.2  0.0 - 0.5 % Final    Gran # (ANC) 06/13/2023 2.3  1.8 - 7.7 K/uL Final    Immature Grans (Abs) 06/13/2023 0.01  0.00 - 0.04 K/uL Final    Comment: Mild elevation in immature granulocytes is non specific and   can be seen in a variety of conditions including stress response,   acute inflammation, trauma and pregnancy. Correlation with other   laboratory and clinical findings is essential.      Lymph # 06/13/2023 3.2  1.0 - 4.8 K/uL Final    Mono # 06/13/2023 0.8  0.3 - 1.0 K/uL Final    Eos # 06/13/2023 0.1  0.0 - 0.5 K/uL Final    Baso # 06/13/2023 0.03  0.00 - 0.20 K/uL Final    nRBC 06/13/2023 0  0 /100 WBC Final    Gran % 06/13/2023 35.4 (L)  38.0 - 73.0 % Final    Lymph % 06/13/2023 49.8 (H)  18.0 - 48.0 % Final    Mono % 06/13/2023 12.8  4.0 - 15.0 % Final    Eosinophil % 06/13/2023 1.3  0.0 - 8.0 % Final    Basophil % 06/13/2023 0.5  0.0 - 1.9 % Final    Differential Method 06/13/2023 Automated   Final    Sodium 06/13/2023 139  136 - 145 mmol/L Final    Potassium 06/13/2023 4.0  3.5 - 5.1 mmol/L Final    Chloride 06/13/2023 106  95 - 110 mmol/L Final    CO2 06/13/2023 27  23 - 29 mmol/L Final    Glucose 06/13/2023 93  70 - 110 mg/dL Final    BUN 06/13/2023 13  6 - 20 mg/dL Final    Creatinine 06/13/2023 0.7  0.5 - 1.4 mg/dL Final    Calcium 06/13/2023 8.9  8.7 - 10.5 mg/dL Final    Total Protein 06/13/2023 6.7  6.0 - 8.4 g/dL Final    Albumin 06/13/2023 3.8  3.5 - 5.2 g/dL Final    Total Bilirubin 06/13/2023 0.8  0.1 - 1.0 mg/dL Final    Comment: For infants and newborns, interpretation of results should be based  on gestational age, weight and in agreement with clinical  observations.    Premature Infant recommended reference ranges:  Up to 24 hours.............<8.0 mg/dL  Up to 48 hours............<12.0  mg/dL  3-5 days..................<15.0 mg/dL  6-29 days.................<15.0 mg/dL      Alkaline Phosphatase 06/13/2023 56  55 - 135 U/L Final    AST 06/13/2023 11  10 - 40 U/L Final    ALT 06/13/2023 8 (L)  10 - 44 U/L Final    Anion Gap 06/13/2023 6 (L)  8 - 16 mmol/L Final    eGFR 06/13/2023 >60.0  >60 mL/min/1.73 m^2 Final    TSH 06/13/2023 1.150  0.400 - 4.000 uIU/mL Final    Magnesium 06/13/2023 1.8  1.6 - 2.6 mg/dL Final    SARS-CoV-2 RNA, Amplification, Qual 06/13/2023 Negative  Negative Final    Comment: This test utilizes isothermal nucleic acid amplification technology   to   detect the SARS-CoV-2 RdRp nucleic acid segment. The analytical   sensitivity   (limit of detection) is 500 copies/swab.     A POSITIVE result is indicative of the presence of SARS-CoV-2 RNA;   clinical   correlation with patient history and other diagnostic information is   necessary to determine patient infection status.    A NEGATIVE result means that SARS-CoV-2 nucleic acids are not present   above   the limit of detection. A NEGATIVE result should be treated as   presumptive.   It does not rule out the possibility of COVID-19 and should not be   the sole   basis for treatment decisions. If COVID-19 is strongly suspected   based on   clinical and exposure history, re-testing using an alternate   molecular assay   should be considered.     This test is only for use under the Food and Drug Administration s   Emergency   Use Authorization (EUA).     Commercial kits are provided by [employer]. [employer]                           e   characteristics of the EUA have been independently verified by   [facility] Department of Pathology and Laboratory Medicine.   _________________________________________________________________   The authorized Fact Sheet for Healthcare Providers and the authorized   Fact   Sheet for Patients of the ID NOW COVID-19 are available on the FDA   website:    https://www.fda.gov/media/050441/download   https://www.fda.gov/media/730946/download      HIV 1/2 Ag/Ab 06/13/2023 Non-reactive  Non-reactive Final    Hepatitis C Ab 06/13/2023 Non-reactive  Non-reactive Final    POC Glucose 06/13/2023 98  70 - 110 mg/dL Final    POC BUN 06/13/2023 15  6 - 30 mg/dL Final    POC Creatinine 06/13/2023 0.6  0.5 - 1.4 mg/dL Final    POC Sodium 06/13/2023 138  136 - 145 mmol/L Final    POC Potassium 06/13/2023 3.9  3.5 - 5.1 mmol/L Final    POC Chloride 06/13/2023 101  95 - 110 mmol/L Final    POC TCO2 (MEASURED) 06/13/2023 28  23 - 29 mmol/L Final    POC Ionized Calcium 06/13/2023 1.24  1.06 - 1.42 mmol/L Final    POC Hematocrit 06/13/2023 39  36 - 54 %PCV Final    Sample 06/13/2023 MARCELO   Final       EKG  06/12/2023 normal sinus rhythm normal rate incomplete right bundle-branch block low-voltage precordial leads    Echo   No results found for this or any previous visit.    Imaging  No results found.    Prior coronary angiogram / intervention:  N/A    Assessment and Plan  1. Palpitations  - Holter monitor - 48 hour; Future    2. Nonspecific abnormal electrocardiogram (ECG) (EKG)  Asymptomatic  - Echo; Future    Follow Up  Follow up in about 6 months (around 12/15/2023).      Aries Blackwell MD, FACC, RPVI  Interventional Cardiology

## 2023-06-16 ENCOUNTER — HOSPITAL ENCOUNTER (OUTPATIENT)
Dept: CARDIOLOGY | Facility: HOSPITAL | Age: 43
Discharge: HOME | End: 2023-06-16
Attending: INTERNAL MEDICINE
Payer: MEDICAID

## 2023-06-16 VITALS — WEIGHT: 127 LBS | HEIGHT: 59 IN | BODY MASS INDEX: 25.6 KG/M2

## 2023-06-16 DIAGNOSIS — R94.31 NONSPECIFIC ABNORMAL ELECTROCARDIOGRAM (ECG) (EKG): ICD-10-CM

## 2023-06-16 DIAGNOSIS — R00.2 PALPITATIONS: ICD-10-CM

## 2023-06-16 PROCEDURE — 93225 XTRNL ECG REC<48 HRS REC: CPT | Mod: PO

## 2023-06-16 PROCEDURE — 93227 XTRNL ECG REC<48 HR R&I: CPT | Mod: ,,, | Performed by: INTERNAL MEDICINE

## 2023-06-16 PROCEDURE — 93306 TTE W/DOPPLER COMPLETE: CPT | Mod: 26,,, | Performed by: INTERNAL MEDICINE

## 2023-06-16 PROCEDURE — 93306 ECHO (CUPID ONLY): ICD-10-PCS | Mod: 26,,, | Performed by: INTERNAL MEDICINE

## 2023-06-16 PROCEDURE — 93306 TTE W/DOPPLER COMPLETE: CPT | Mod: PO

## 2023-06-16 PROCEDURE — 93227 HOLTER MONITOR - 48 HOUR (CUPID ONLY): ICD-10-PCS | Mod: ,,, | Performed by: INTERNAL MEDICINE

## 2023-06-18 LAB
AORTIC ROOT ANNULUS: 2.21 CM
AORTIC VALVE CUSP SEPERATION: 1.62 CM
AV INDEX (PROSTH): 0.68
AV MEAN GRADIENT: 4 MMHG
AV PEAK GRADIENT: 9 MMHG
AV VALVE AREA: 1.81 CM2
AV VELOCITY RATIO: 0.64
BSA FOR ECHO PROCEDURE: 1.55 M2
CV ECHO LV RWT: 0.39 CM
DOP CALC AO PEAK VEL: 1.52 M/S
DOP CALC AO VTI: 35.7 CM
DOP CALC LVOT AREA: 2.7 CM2
DOP CALC LVOT DIAMETER: 1.85 CM
DOP CALC LVOT PEAK VEL: 0.98 M/S
DOP CALC LVOT STROKE VOLUME: 64.75 CM3
DOP CALC MV VTI: 19.2 CM
DOP CALCLVOT PEAK VEL VTI: 24.1 CM
E WAVE DECELERATION TIME: 144.64 MSEC
E/A RATIO: 0.87
ECHO LV POSTERIOR WALL: 0.85 CM (ref 0.6–1.1)
EJECTION FRACTION: 55 %
FRACTIONAL SHORTENING: 33 % (ref 28–44)
INTERVENTRICULAR SEPTUM: 0.98 CM (ref 0.6–1.1)
IVC DIAMETER: 1.54 CM
LA MAJOR: 4.1 CM
LA MINOR: 3.6 CM
LA WIDTH: 3.3 CM
LEFT ATRIUM SIZE: 2.94 CM
LEFT ATRIUM VOLUME INDEX MOD: 17.7 ML/M2
LEFT ATRIUM VOLUME INDEX: 20.8 ML/M2
LEFT ATRIUM VOLUME MOD: 26.86 CM3
LEFT ATRIUM VOLUME: 31.62 CM3
LEFT INTERNAL DIMENSION IN SYSTOLE: 2.92 CM (ref 2.1–4)
LEFT VENTRICLE DIASTOLIC VOLUME INDEX: 56 ML/M2
LEFT VENTRICLE DIASTOLIC VOLUME: 85.12 ML
LEFT VENTRICLE MASS INDEX: 84 G/M2
LEFT VENTRICLE SYSTOLIC VOLUME INDEX: 21.6 ML/M2
LEFT VENTRICLE SYSTOLIC VOLUME: 32.89 ML
LEFT VENTRICULAR INTERNAL DIMENSION IN DIASTOLE: 4.34 CM (ref 3.5–6)
LEFT VENTRICULAR MASS: 128.01 G
LVOT MG: 2.12 MMHG
LVOT MV: 0.68 CM/S
MV A" WAVE DURATION": 104.66 MSEC
MV MEAN GRADIENT: 3 MMHG
MV PEAK A VEL: 1.07 M/S
MV PEAK E VEL: 0.93 M/S
MV PEAK GRADIENT: 5 MMHG
MV STENOSIS PRESSURE HALF TIME: 41.95 MS
MV VALVE AREA BY CONTINUITY EQUATION: 3.37 CM2
MV VALVE AREA P 1/2 METHOD: 5.24 CM2
OHS LV EJECTION FRACTION SIMPSONS BIPLANE MOD: 6 %
PISA TR MAX VEL: 2.36 M/S
PULM VEIN S/D RATIO: 0.9
PV MV: 0.56 M/S
PV PEAK D VEL: 0.5 M/S
PV PEAK S VEL: 0.45 M/S
PV PEAK VELOCITY: 0.77 CM/S
RA MAJOR: 3.49 CM
RA PRESSURE: 3 MMHG
RIGHT VENTRICULAR END-DIASTOLIC DIMENSION: 2.01 CM
STJ: 2.27 CM
TR MAX PG: 22 MMHG
TV REST PULMONARY ARTERY PRESSURE: 25 MMHG

## 2023-06-19 ENCOUNTER — OFFICE VISIT (OUTPATIENT)
Dept: FAMILY MEDICINE | Facility: HOSPITAL | Age: 43
End: 2023-06-19
Payer: MEDICAID

## 2023-06-19 ENCOUNTER — LAB VISIT (OUTPATIENT)
Dept: LAB | Facility: HOSPITAL | Age: 43
End: 2023-06-19
Payer: MEDICAID

## 2023-06-19 VITALS
DIASTOLIC BLOOD PRESSURE: 81 MMHG | WEIGHT: 129.44 LBS | BODY MASS INDEX: 26.09 KG/M2 | HEIGHT: 59 IN | SYSTOLIC BLOOD PRESSURE: 120 MMHG | HEART RATE: 60 BPM

## 2023-06-19 DIAGNOSIS — Z13.220 SCREENING, LIPID: ICD-10-CM

## 2023-06-19 DIAGNOSIS — D64.9 ANEMIA, UNSPECIFIED TYPE: ICD-10-CM

## 2023-06-19 DIAGNOSIS — Z13.1 SCREENING FOR DIABETES MELLITUS (DM): ICD-10-CM

## 2023-06-19 DIAGNOSIS — K21.9 GASTROESOPHAGEAL REFLUX DISEASE WITHOUT ESOPHAGITIS: ICD-10-CM

## 2023-06-19 DIAGNOSIS — K21.9 GASTROESOPHAGEAL REFLUX DISEASE WITHOUT ESOPHAGITIS: Primary | ICD-10-CM

## 2023-06-19 LAB
CHOLEST SERPL-MCNC: 155 MG/DL (ref 120–199)
CHOLEST/HDLC SERPL: 1.9 {RATIO} (ref 2–5)
ESTIMATED AVG GLUCOSE: 103 MG/DL (ref 68–131)
FERRITIN SERPL-MCNC: 33 NG/ML (ref 20–300)
HBA1C MFR BLD: 5.2 % (ref 4–5.6)
HDLC SERPL-MCNC: 80 MG/DL (ref 40–75)
HDLC SERPL: 51.6 % (ref 20–50)
IRON SERPL-MCNC: 56 UG/DL (ref 30–160)
LDLC SERPL CALC-MCNC: 65 MG/DL (ref 63–159)
NONHDLC SERPL-MCNC: 75 MG/DL
SATURATED IRON: 15 % (ref 20–50)
TOTAL IRON BINDING CAPACITY: 363 UG/DL (ref 250–450)
TRANSFERRIN SERPL-MCNC: 245 MG/DL (ref 200–375)
TRIGL SERPL-MCNC: 50 MG/DL (ref 30–150)

## 2023-06-19 PROCEDURE — 84466 ASSAY OF TRANSFERRIN: CPT | Performed by: STUDENT IN AN ORGANIZED HEALTH CARE EDUCATION/TRAINING PROGRAM

## 2023-06-19 PROCEDURE — 82728 ASSAY OF FERRITIN: CPT | Performed by: STUDENT IN AN ORGANIZED HEALTH CARE EDUCATION/TRAINING PROGRAM

## 2023-06-19 PROCEDURE — 86677 HELICOBACTER PYLORI ANTIBODY: CPT | Performed by: STUDENT IN AN ORGANIZED HEALTH CARE EDUCATION/TRAINING PROGRAM

## 2023-06-19 PROCEDURE — 36415 COLL VENOUS BLD VENIPUNCTURE: CPT | Performed by: STUDENT IN AN ORGANIZED HEALTH CARE EDUCATION/TRAINING PROGRAM

## 2023-06-19 PROCEDURE — 83036 HEMOGLOBIN GLYCOSYLATED A1C: CPT | Performed by: STUDENT IN AN ORGANIZED HEALTH CARE EDUCATION/TRAINING PROGRAM

## 2023-06-19 PROCEDURE — 99213 OFFICE O/P EST LOW 20 MIN: CPT | Performed by: STUDENT IN AN ORGANIZED HEALTH CARE EDUCATION/TRAINING PROGRAM

## 2023-06-19 PROCEDURE — 80061 LIPID PANEL: CPT | Performed by: STUDENT IN AN ORGANIZED HEALTH CARE EDUCATION/TRAINING PROGRAM

## 2023-06-19 RX ORDER — FAMOTIDINE 20 MG/1
20 TABLET, FILM COATED ORAL 2 TIMES DAILY
Qty: 60 TABLET | Refills: 11 | Status: SHIPPED | OUTPATIENT
Start: 2023-06-19 | End: 2023-09-08 | Stop reason: HOSPADM

## 2023-06-19 NOTE — LETTER
June 19, 2023      [facility]  [address], [address]  [place] [address]  Phone: [phone]  Fax: [phone]       Patient: Yesenia Sanches   YOB: 1980  Date of Visit: 06/19/2023    To Whom It May Concern:    ADVI Sanches  was at [facility] on 06/19/2023 and has been recovering from a procedure since 6/15/23. The patient may return to work on 9/20/23 with no restrictions. If you have any questions or concerns, or if I can be of further assistance, please do not hesitate to contact me.    Sincerely,    Juancarlos Call MD

## 2023-06-19 NOTE — PROGRESS NOTES
[facility]  History & Physical    SUBJECTIVE:     Chief Complaint:   Chief Complaint   Patient presents with    Follow-up       History of Present Illness:  42 y.o. female who  has no past medical history on file. presents to clinic today for anxiety, follow up for recent hospital visit due to palpitations, and GERD. Patient was recently evaluated due to sensation of palpitations. She was evaluated by cardiology and recently completed a 48hr holter monitor study and echo. Patient reports that she has been increasing anxious due to a medical scare from a breast mass in may. Patient recently was told the the mass was noncancerous but she will need close monitoring. Patient states that she continues to have episodes of feeling her heart race or skip a beat. These sensations are accompanied by a feeling or low mood, fear or sadness. Patient states she would like to avoid anti-anxiety medication. Patient states she sees a therapist weekly that is provided through her work. Patient also reports episodes of abdominal pain that she has struggled whit for many years. Patient was on PPI for a long time but says it is no longer working.      Allergies:  Review of patient's allergies indicates:  No Known Allergies    Home Medications:  Current Outpatient Medications on File Prior to Visit   Medication Sig    cyanocobalamin (VITAMIN B-12) 1000 MCG tablet Take 1,000 mcg by mouth.    ibuprofen (ADVIL,MOTRIN) 200 MG tablet Take 400 mg by mouth daily as needed.    omeprazole (PRILOSEC) 20 MG capsule Take 20 mg by mouth daily as needed.     No current facility-administered medications on file prior to visit.       No past medical history on file.  No past surgical history on file.  Family History   Problem Relation Age of Onset    Hypertension Mother     Heart disease Father     Stroke Father     Hypertension Father      Social History     Tobacco Use    Smoking status: Never    Smokeless tobacco: Never   Substance Use  Topics    Alcohol use: No    Drug use: No        Review of Systems   Constitutional:  Negative for fever, malaise/fatigue and weight loss.   HENT:  Negative for hearing loss and sore throat.    Eyes:  Negative for blurred vision.   Respiratory:  Negative for cough, shortness of breath and wheezing.    Cardiovascular:  Positive for palpitations. Negative for chest pain and leg swelling.   Gastrointestinal:  Positive for heartburn and nausea. Negative for vomiting.   Genitourinary:  Negative for dysuria.   Musculoskeletal:  Negative for back pain, joint pain and myalgias.   Skin:  Negative for rash.   Neurological:  Negative for dizziness, weakness and headaches.   Psychiatric/Behavioral:  The patient is nervous/anxious.       OBJECTIVE:     Vital Signs:  Pulse: 60 (06/19/23 1016)  BP: 120/81 (06/19/23 1016)  Body mass index is 26.14 kg/m².  Physical Exam  HENT:      Right Ear: External ear normal.      Left Ear: External ear normal.   Eyes:      Extraocular Movements: Extraocular movements intact.      Pupils: Pupils are equal, round, and reactive to light.   Cardiovascular:      Rate and Rhythm: Normal rate.      Pulses: Normal pulses.      Heart sounds: Normal heart sounds.   Pulmonary:      Effort: Pulmonary effort is normal.      Breath sounds: Normal breath sounds.   Abdominal:      General: Abdomen is flat.      Palpations: Abdomen is soft.   Musculoskeletal:         General: Normal range of motion.      Cervical back: Normal range of motion.   Skin:     General: Skin is warm.   Neurological:      General: No focal deficit present.      Mental Status: She is alert and oriented to person, place, and time.   Psychiatric:         Behavior: Behavior normal.      Comments: Gad7: 9 mild/moderate anxiety       Laboratory:  Hemoglobin A1C   Date Value Ref Range Status   06/19/2023 5.2 4.0 - 5.6 % Final     Comment:     ADA Screening Guidelines:  5.7-6.4%  Consistent with prediabetes  >or=6.5%  Consistent with  diabetes    High levels of fetal hemoglobin interfere with the HbA1C  assay. Heterozygous hemoglobin variants (HbS, HgC, etc)do  not significantly interfere with this assay.   However, presence of multiple variants may affect accuracy.         A/P:  Yesenia was seen today for follow-up. Will defer further work up of any potential cardiac condition to cardiology at this time. Patient likely still struggling with anxiety due to recent life stressors. Discussed breathing techniques, and the body inventory method for relaxation and mindfulness. Patient would like to avoid medication at this time. She will consider lexapro if anxiety continues at its current level.  H pylori testing ordered for continued and worsening GERD. Patient most recent CBC showed microcyctic anemia, will order iron studies.    Diagnoses and all orders for this visit:    Gastroesophageal reflux disease without esophagitis  -     H. PYLORI ANTIBODY, IGG; Future    Screening, lipid  -     Lipid Panel; Future    Screening for diabetes mellitus (DM)  -     Hemoglobin A1C; Future    Anemia, unspecified type  -     Iron and TIBC; Future  -     Ferritin; Future    Other orders  -     famotidine (PEPCID) 20 MG tablet; Take 1 tablet (20 mg total) by mouth 2 (two) times daily.        No follow-ups on file.      Juancarlos Call MD  [facility] PGY-2  06/20/2023

## 2023-06-20 LAB — H PYLORI IGG SERPL QL IA: NEGATIVE

## 2023-06-26 LAB
OHS CV EVENT MONITOR DAY: 0
OHS CV HOLTER LENGTH DECIMAL HOURS: 47.98
OHS CV HOLTER LENGTH HOURS: 47
OHS CV HOLTER LENGTH MINUTES: 59
OHS CV HOLTER SINUS AVERAGE HR: 77
OHS CV HOLTER SINUS MAX HR: 143
OHS CV HOLTER SINUS MIN HR: 45

## 2023-08-29 ENCOUNTER — OFFICE VISIT (OUTPATIENT)
Dept: FAMILY MEDICINE | Facility: HOSPITAL | Age: 43
End: 2023-08-29
Payer: MEDICAID

## 2023-08-29 VITALS
BODY MASS INDEX: 25.02 KG/M2 | HEART RATE: 68 BPM | HEIGHT: 60 IN | SYSTOLIC BLOOD PRESSURE: 125 MMHG | WEIGHT: 127.44 LBS | DIASTOLIC BLOOD PRESSURE: 83 MMHG

## 2023-08-29 DIAGNOSIS — K21.9 GASTROESOPHAGEAL REFLUX DISEASE, UNSPECIFIED WHETHER ESOPHAGITIS PRESENT: Primary | ICD-10-CM

## 2023-08-29 PROBLEM — I47.10 PAROXYSMAL SUPRAVENTRICULAR TACHYCARDIA: Status: ACTIVE | Noted: 2023-08-07

## 2023-08-29 PROCEDURE — 99213 OFFICE O/P EST LOW 20 MIN: CPT | Performed by: STUDENT IN AN ORGANIZED HEALTH CARE EDUCATION/TRAINING PROGRAM

## 2023-08-29 NOTE — PROGRESS NOTES
"Clinic Note  [facility]    Subjective:      Yesenia Sanches is a 42 y.o. female with PMHx GERD, paroxysmal SVT here due to complaint of "chest pain" from eating cold foods.    Cold food intolerance - For almost 1 week endorses sharp pain in her chest when she has her daily after-work snoball.She notices similar discomfort when she drinks very cold drinks. Room temperature and hot foods are fine.  States it doesn't feel anything like her SVT symptoms (currently following with cards). Takes pepcid twice a day for acid reflux..States her reflux symptoms are worse with eggs, chocolate, spaghetti with red gravy. Patient says eats close to bed time almost every day.      Review of Systems   Constitutional:  Negative for chills and fever.   HENT:  Negative for congestion and sore throat.    Respiratory:  Negative for cough.    Cardiovascular:  Positive for chest pain. Negative for palpitations.   Gastrointestinal:  Positive for heartburn. Negative for abdominal pain, diarrhea, nausea and vomiting.   Genitourinary:  Negative for dysuria.   Musculoskeletal:  Negative for joint pain and myalgias.   Neurological:  Negative for dizziness, tingling, weakness and headaches.   Psychiatric/Behavioral:  Negative for depression. The patient is not nervous/anxious.           Objective:      Vitals:    08/29/23 1539   BP: 125/83   Pulse: 68     Body mass index is 24.89 kg/m².      Physical Exam  Constitutional:       Appearance: Normal appearance. She is well-developed.   Cardiovascular:      Rate and Rhythm: Normal rate and regular rhythm.      Pulses: Normal pulses.   Pulmonary:      Effort: Pulmonary effort is normal.      Breath sounds: Normal breath sounds.   Abdominal:      General: Abdomen is flat. Bowel sounds are normal.      Palpations: Abdomen is soft.   Musculoskeletal:         General: Normal range of motion.      Cervical back: Normal range of motion.   Skin:     General: Skin is warm and dry.      Capillary " Refill: Capillary refill takes less than 2 seconds.      Coloration: Skin is not pale.   Neurological:      General: No focal deficit present.      Mental Status: She is alert and oriented to person, place, and time.   Psychiatric:         Mood and Affect: Mood normal.         Behavior: Behavior normal.            Assessment/Plan:      Given patient's hx of nightly food intake immediately before bed, reasonable to think this is manifestation of worsening GERD-related esophageal irritation with possible component of spasm. Advised patient to lay off cold foods for now, cut off eating at most 2 hours before bed. Continue adherence to  BID famotidine (as she does) and can try to slowly re-introduce cold foods after few weeks of modified habits. If problem gets worse or extends to non-cold foods can consider GI referral.      1. Gastroesophageal reflux disease, unspecified whether esophagitis present    Patient discussed with attending physician, Dr. Nahum Lee MD  [facility] PGY-3  08/30/2023      The following information is provided to all patients.  This information is to help you find resources for any of the problems found today that may be affecting your health:                Living healthy guide: www.[facility].[place].gov       Understanding Diabetes: www.diabetes.org       Eating healthy: www.cdc.gov/healthyweight      CDC home safety checklist: www.cdc.gov/steadi/patient.html      Agency on Aging: www.goea.[place].gov       Alcoholics anonymous (AA): www.aa.org      Physical Activity: www.vidhi.nih.gov/qn6bopw       Tobacco use: www.quitwithusla.org

## 2023-09-08 ENCOUNTER — OFFICE VISIT (OUTPATIENT)
Dept: URGENT CARE | Facility: CLINIC | Age: 43
End: 2023-09-08
Payer: MEDICAID

## 2023-09-08 VITALS
HEART RATE: 71 BPM | SYSTOLIC BLOOD PRESSURE: 138 MMHG | HEIGHT: 60 IN | OXYGEN SATURATION: 99 % | WEIGHT: 127.44 LBS | DIASTOLIC BLOOD PRESSURE: 96 MMHG | TEMPERATURE: 98 F | RESPIRATION RATE: 20 BRPM | BODY MASS INDEX: 25.02 KG/M2

## 2023-09-08 DIAGNOSIS — H00.011 HORDEOLUM EXTERNUM OF RIGHT UPPER EYELID: Primary | ICD-10-CM

## 2023-09-08 DIAGNOSIS — K21.9 GASTROESOPHAGEAL REFLUX DISEASE, UNSPECIFIED WHETHER ESOPHAGITIS PRESENT: ICD-10-CM

## 2023-09-08 PROCEDURE — 99213 PR OFFICE/OUTPT VISIT, EST, LEVL III, 20-29 MIN: ICD-10-PCS | Mod: S$GLB,,, | Performed by: NURSE PRACTITIONER

## 2023-09-08 PROCEDURE — 99213 OFFICE O/P EST LOW 20 MIN: CPT | Mod: S$GLB,,, | Performed by: NURSE PRACTITIONER

## 2023-09-08 RX ORDER — NEOMYCIN SULFATE, POLYMYXIN B SULFATE AND DEXAMETHASONE 3.5; 10000; 1 MG/ML; [USP'U]/ML; MG/ML
1 SUSPENSION/ DROPS OPHTHALMIC
Qty: 5 ML | Refills: 0 | Status: SHIPPED | OUTPATIENT
Start: 2023-09-08

## 2023-09-08 RX ORDER — NEOMYCIN SULFATE, POLYMYXIN B SULFATE, HYDROCORTISONE 3.5; 10000; 1 MG/ML; [USP'U]/ML; MG/ML
SOLUTION/ DROPS AURICULAR (OTIC)
Qty: 10 ML | Refills: 0 | Status: SHIPPED | OUTPATIENT
Start: 2023-09-08

## 2023-09-08 RX ORDER — ESOMEPRAZOLE MAGNESIUM 40 MG/1
40 CAPSULE, DELAYED RELEASE ORAL
Qty: 30 CAPSULE | Refills: 11 | Status: SHIPPED | OUTPATIENT
Start: 2023-09-08 | End: 2024-09-07

## 2023-09-08 NOTE — PROGRESS NOTES
Subjective:      Patient ID: Yesenia Sanches is a 42 y.o. female.    Vitals:  height is 5' (1.524 m) and weight is 57.8 kg (127 lb 6.8 oz). Her oral temperature is 98.4 °F (36.9 °C). Her blood pressure is 138/96 (abnormal) and her pulse is 71. Her respiration is 20 and oxygen saturation is 99%.     Chief Complaint: Eye Problem    This is a 42 y.o. female who presents today with a chief complaint of discomfort when eating or drinking in back and chest , patient states has aids reflex problems.   Patient c/o swelling of right eye. Patient states looks like pimple on eye lid.         Eye Problem   The right eye is affected. This is a new problem. The current episode started yesterday. The problem occurs constantly. Associated symptoms include nausea. Pertinent negatives include no fever or vomiting.       Constitution: Negative for activity change, fatigue and fever.   HENT:  Negative for ear pain, facial swelling and sore throat.    Neck: neck negative.   Eyes:  Positive for eye pain and eyelid swelling. Negative for eye trauma and vision loss.   Respiratory: Negative.     Gastrointestinal:  Positive for nausea and heartburn. Negative for abdominal trauma and vomiting.      Objective:     Physical Exam   HENT:   Head: Normocephalic.   Ears:   Right Ear: Tympanic membrane normal.   Left Ear: Tympanic membrane normal.   Eyes: Right eye exhibits hordeolum. Right eye exhibits no discharge.      Comments: + R eyelid swollen with erythema   Pulmonary/Chest: Effort normal.   Abdominal: Normal appearance.   Neurological: She is alert.       Assessment:     1. Hordeolum externum of right upper eyelid    2. Gastroesophageal reflux disease, unspecified whether esophagitis present        Plan:   Ms. Sanches present for a c/o of eye pain and chest discomfort after eating. States her eye lid has a bump under the top lid and painful to touch. Hx. Reflux, has been taking Prevacid.States continue to have a fullness and pressure in  chest after eating with nausea. Denies any vomiting, belching or bloating.     Hordeolum externum of right upper eyelid  -     neomycin-polymyxin-hydrocortisone (CORTISPORIN) otic solution; 4 drops affected ear bid  Dispense: 10 mL; Refill: 0    Gastroesophageal reflux disease, unspecified whether esophagitis present  -     esomeprazole (NEXIUM) 40 MG capsule; Take 1 capsule (40 mg total) by mouth before breakfast.  Dispense: 30 capsule; Refill: 11  -     Ambulatory referral/consult to Gastroenterology

## 2023-10-04 ENCOUNTER — OFFICE VISIT (OUTPATIENT)
Dept: FAMILY MEDICINE | Facility: HOSPITAL | Age: 43
End: 2023-10-04
Payer: MEDICAID

## 2023-10-04 VITALS
WEIGHT: 125.69 LBS | BODY MASS INDEX: 24.68 KG/M2 | DIASTOLIC BLOOD PRESSURE: 84 MMHG | HEIGHT: 60 IN | HEART RATE: 78 BPM | SYSTOLIC BLOOD PRESSURE: 125 MMHG

## 2023-10-04 DIAGNOSIS — F41.9 ANXIETY AND DEPRESSION: Primary | ICD-10-CM

## 2023-10-04 DIAGNOSIS — F32.A ANXIETY AND DEPRESSION: Primary | ICD-10-CM

## 2023-10-04 PROCEDURE — 99213 OFFICE O/P EST LOW 20 MIN: CPT

## 2023-10-04 RX ORDER — FLUOXETINE HYDROCHLORIDE 20 MG/1
20 CAPSULE ORAL DAILY
Qty: 90 CAPSULE | Refills: 1 | Status: SHIPPED | OUTPATIENT
Start: 2023-10-04 | End: 2024-01-04 | Stop reason: ALTCHOICE

## 2023-10-04 NOTE — PROGRESS NOTES
[facility] FAMILY MEDICINE CLINIC NOTE  Follow-up Visit      SUBJECTIVE:     Patient: Yesenia Sanches is a 43 y.o. female. She has PMHx of GERD, paroxsymal SVT.     Chief Complaint:   Chief Complaint   Patient presents with    Gastroesophageal Reflux       History of Present Illness:  Pt reports heartburn, not controlled by Nexium and eating smaller portions  Worsened by drinking cold beverages  Endorsed same sx at clinic visit late August 2023 here  Also reports sharp R-sided CP that lasts 10-15 minutes and self-resolve  Previous EKG NSR  Echo EF 55%, normal systolic and LV diastolic func  Sees Cardiology at [facility], last seen 9/11/2023 and reassurance provided at that time with consideration of monitor  Likely 2/2 to Anxiety  Pt reports taking Ashwaganda, ground hermila, probiotics, peppermint oil and fennel    ANGELIA-7 total score was 17 in clinic today  PHQ-9 total score was 17 in clinic today   Pt agreeable to psychology referral and starting Prozac      ROS  A 10+ review of systems was performed with pertinent positives and negatives noted above in the history of present illness. Other systems were negative unless otherwise specified.    OBJECTIVE:     Vital Signs (Most Recent)  Vitals:    10/04/23 1534   BP: 125/84   Pulse: 78   Weight: 57 kg (125 lb 10.6 oz)   Height: 5' (1.524 m)     BMI: Body mass index is 24.54 kg/m².     Physical Exam:  Gen: No apparent distress, cooperative & interactive  CV: RRR, S1 and S2 present, no LE edema  Resp: CTAB, normal respiratory effort     ASSESSMENT:   Yesenia Sanches is a 43 y.o. female who presents to clinic to for    1. Anxiety and depression         PLAN:       1. Anxiety and depression  - Ambulatory referral/consult to Psychology; Future  - New med started:  - FLUoxetine 20 MG capsule; Take 1 capsule (20 mg total) by mouth once daily.  Dispense: 90 capsule; Refill: 1  - Counseled pt, RTC in 1 month for follow up        Provided patient with anticipatory guidance and return  precautions. Treatment plan discussed with patient, all questions answered, and patient acknowledged understanding and verbal agreement.      Follow-up in: 1 month; or sooner PRN if acute concerns arise.    Case discussed with Dr. ANGE Guaman MD, MPH  [facility] Family Medicine PGY-2  10/04/2023        The following information is provided to all patients.  This information is to help you find resources for any of the problems found today that may be affecting your health:                Living healthy guide: www.[facility].[place].[facility]       Understanding Diabetes: www.diabetes.org       Eating healthy: www.cdc.gov/healthyweight      [facility] home safety checklist: www.cdc.gov/steadi/patient.html      Agency on Aging: www.goea.[place].gov       Alcoholics anonymous (AA): www.aa.org      Physical Activity: www.vidhi.nih.gov/sa5wvvs       Tobacco use: www.quitwithusla.org

## 2023-10-30 ENCOUNTER — PATIENT MESSAGE (OUTPATIENT)
Dept: PSYCHIATRY | Facility: HOSPITAL | Age: 43
End: 2023-10-30
Payer: MEDICAID

## 2023-10-30 ENCOUNTER — TELEPHONE (OUTPATIENT)
Dept: PSYCHIATRY | Facility: HOSPITAL | Age: 43
End: 2023-10-30
Payer: MEDICAID

## 2023-10-30 NOTE — TELEPHONE ENCOUNTER
Practitioner called patient for consultation for therapy sessions. Patient stated she is struggling with anxiety. She noted her PCP gave her a prescription for anxiety which she noted is helpful. Patient also stated she still experiences anxiety surrounding her health. Patient noted she prefers virtual services. Practitioner informed patient she is unable to provide virtual services but will send virtual referrals to patient.

## 2023-11-17 NOTE — PROGRESS NOTES
I assume primary medical responsibility for this patient. I have reviewed the history, physical, and assessement & treatment plan with the resident and agree that the care is reasonable and necessary. This service has been performed by a resident without the presence of a teaching physician under the primary care exception. If necessary, an addendum of additional findings or evaluation beyond the resident documentation will be noted below.     Twyla Larson MD

## 2024-01-04 ENCOUNTER — OFFICE VISIT (OUTPATIENT)
Dept: FAMILY MEDICINE | Facility: HOSPITAL | Age: 44
End: 2024-01-04
Payer: MEDICAID

## 2024-01-04 VITALS
WEIGHT: 126.75 LBS | HEIGHT: 60 IN | SYSTOLIC BLOOD PRESSURE: 125 MMHG | BODY MASS INDEX: 24.88 KG/M2 | DIASTOLIC BLOOD PRESSURE: 82 MMHG

## 2024-01-04 DIAGNOSIS — F41.9 ANXIETY AND DEPRESSION: Primary | ICD-10-CM

## 2024-01-04 DIAGNOSIS — F32.A ANXIETY AND DEPRESSION: Primary | ICD-10-CM

## 2024-01-04 PROCEDURE — 99213 OFFICE O/P EST LOW 20 MIN: CPT

## 2024-01-04 RX ORDER — VENLAFAXINE HYDROCHLORIDE 37.5 MG/1
37.5 CAPSULE, EXTENDED RELEASE ORAL DAILY
Qty: 30 CAPSULE | Refills: 2 | Status: SHIPPED | OUTPATIENT
Start: 2024-01-04 | End: 2024-04-03

## 2024-01-04 NOTE — PROGRESS NOTES
[facility] FAMILY MEDICINE CLINIC NOTE  Follow-up Visit      SUBJECTIVE:     Patient: Yesenia Sanches is a 43 y.o. female.    Chief Complaint:   Chief Complaint   Patient presents with    Medication Problem    Anxiety       History of Present Illness:  Patient presents to clinic to follow-up on anxiety medication  Patient was started on Prozac 20 mg last visit early October in clinic  She reports she took about 3 days worth of Prozac before stopping completely due to dizziness/side effects    Patient reports she took venlafaxine ER 37.5 mg from her sister's prescription for 2 weeks and that it was very effective  She has now been off of venlafaxine for 1 week  Discussed importance of avoiding stopping medication abruptly with patient, she verbalized understanding    PHQ-9 total score 3 today in clinic (previously 17)  ANGELIA-7 total score 5 today in clinic (previously 17)    Patient declines flu shot at this time    Patient due for Pap smear, would like to arrange for next visit    Patient has mammogram ordered by Dr. Bobby PONCE  A 10+ review of systems was performed with pertinent positives and negatives noted above in the history of present illness. Other systems were negative unless otherwise specified.    OBJECTIVE:     Vital Signs (Most Recent)  Vitals:    01/04/24 1541   BP: 125/82   BP Location: Left arm   Patient Position: Sitting   BP Method: Medium (Automatic)   Weight: 57.5 kg (126 lb 12.2 oz)   Height: 5' (1.524 m)     BMI: Body mass index is 24.76 kg/m².     Physical Exam:  Gen: No apparent distress, cooperative & interactive  CV: RRR, S1 and S2 present, no LE edema  Resp: CTAB, normal respiratory effort     ASSESSMENT:   Yesenia Sanches is a 43 y.o. female who presents to clinic to for    1. Anxiety and depression         PLAN:       1. Anxiety and depression  Started new medication, pt counseled:  - venlafaxine (EFFEXOR-XR) 37.5 MG 24 hr capsule; Take 1 capsule (37.5 mg total) by mouth once daily.   Dispense: 30 capsule; Refill: 2  - Plan to get follow up EKG in 1 month at next clinic visit to check Qtc on Effexor       Provided patient with anticipatory guidance and return precautions. Treatment plan discussed with patient, all questions answered, and patient acknowledged understanding and verbal agreement.      Follow-up in: 1 month; or sooner PRN if acute concerns arise.      Case discussed with Dr. Jessie Guaman MD, MPH  \[facility] [place] [facility]\"" Family Medicine PGY-2  01/04/2024        The following information is provided to all patients.  This information is to help you find resources for any of the problems found today that may be affecting your health:                Living healthy guide: www.[facility].[place].[facility]       Understanding Diabetes: www.diabetes.org       Eating healthy: www.cdc.gov/healthyweight      [facility] home safety checklist: www.cdc.gov/steadi/patient.html      Agency on Aging: www.goea.[place].[facility]       Alcoholics anonymous (AA): www.aa.org      Physical Activity: www.vidhi.nih.gov/aw2wpuz       Tobacco use: www.quitwithusla.org

## 2024-04-01 NOTE — PATIENT INSTRUCTIONS
See additional patient Instructions provided    Patient Instructions   - You must understand that you have received an Urgent Care treatment only and that you may be released before all of your medical problems are known or treated.   - You, the patient, will arrange for follow up care as instructed.   - If your condition worsens or fails to improve we recommend that you receive another evaluation at the ER immediately or contact your PCP to discuss your concerns or return here.     Advised on return/follow-up precautions. Advised on ER precautions. Answered all patient questions. Patient verbalized understanding and voiced agreement with current treatment plan.     grab bars/independent/needs device

## 2024-05-08 ENCOUNTER — OFFICE VISIT (OUTPATIENT)
Dept: FAMILY MEDICINE | Facility: HOSPITAL | Age: 44
End: 2024-05-08

## 2024-05-08 VITALS
WEIGHT: 135.13 LBS | BODY MASS INDEX: 26.53 KG/M2 | HEIGHT: 60 IN | SYSTOLIC BLOOD PRESSURE: 122 MMHG | DIASTOLIC BLOOD PRESSURE: 81 MMHG | HEART RATE: 80 BPM

## 2024-05-08 DIAGNOSIS — Z91.89 ELECTROLYTE IMBALANCE RISK: ICD-10-CM

## 2024-05-08 DIAGNOSIS — F32.A ANXIETY AND DEPRESSION: Primary | ICD-10-CM

## 2024-05-08 DIAGNOSIS — Z13.1 SCREENING FOR DIABETES MELLITUS (DM): ICD-10-CM

## 2024-05-08 DIAGNOSIS — Z13.29 SCREENING FOR THYROID DISORDER: ICD-10-CM

## 2024-05-08 DIAGNOSIS — F41.9 ANXIETY AND DEPRESSION: Primary | ICD-10-CM

## 2024-05-08 DIAGNOSIS — Z13.220 SCREENING, LIPID: ICD-10-CM

## 2024-05-08 DIAGNOSIS — D64.9 ANEMIA, UNSPECIFIED TYPE: ICD-10-CM

## 2024-05-08 PROCEDURE — 99213 OFFICE O/P EST LOW 20 MIN: CPT

## 2024-05-08 RX ORDER — VENLAFAXINE HYDROCHLORIDE 37.5 MG/1
37.5 CAPSULE, EXTENDED RELEASE ORAL DAILY
Qty: 30 CAPSULE | Refills: 2 | Status: SHIPPED | OUTPATIENT
Start: 2024-05-08 | End: 2024-08-06

## 2024-05-08 NOTE — PROGRESS NOTES
[facility] FAMILY MEDICINE CLINIC NOTE  Follow-up Visit      SUBJECTIVE:     Patient: Yesenia Sanches is a 43 y.o. female.    Chief Complaint:   Chief Complaint   Patient presents with    Medication Refill       History of Present Illness:  Patient presents to clinic for follow-up on anxiety and depression, needs medication refills  Reports doing well on Effexor  ANGELIA-7 total score 3 today in clinic (previously 3 in January 2024 and 17 in October 2023)  PHQ-9 total score 4 today in clinic (previously 5 in January 2024 and 17 in October 2023)    Enquiring about Periactin, she reports she used her friend's medication and it helped with her appetite.  She tried it for about 2 weeks.  Advised against Periactin, discussed with patient that it is not indicated and has associated adverse effects.  BMI 26.39.    Today patient weighs 135 lb, previously 126 lb in January 2024      ROS  A 10+ review of systems was performed with pertinent positives and negatives noted above in the history of present illness. Other systems were negative unless otherwise specified.    OBJECTIVE:     Vital Signs (Most Recent)  Vitals:    05/08/24 1125   BP: 122/81   Pulse: 80   Weight: 61.3 kg (135 lb 2.3 oz)   Height: 5' (1.524 m)     BMI: Body mass index is 26.39 kg/m².     Physical Exam:  Gen: No apparent distress, cooperative & interactive  CV: RRR, S1 and S2 present, no LE edema  Resp: CTAB, normal respiratory effort     ASSESSMENT:   Yesenia Sanches is a 43 y.o. female who presents to clinic to for    1. Anxiety and depression    2. Anemia, unspecified type    3. Screening for diabetes mellitus (DM)    4. Screening, lipid    5. Screening for thyroid disorder    6. Electrolyte imbalance risk         PLAN:     1. Anxiety and depression  - improving, continue current management  - ANGELIA-7 total score 3 today in clinic (previously 3 in January 2024 and 17 in October 2023)  - PHQ-9 total score 4 today in clinic (previously 5 in January 2024 and 17  in October 2023)  Refilled:  - venlafaxine (EFFEXOR-XR) 37.5 MG 24 hr capsule; Take 1 capsule (37.5 mg total) by mouth once daily.  Dispense: 30 capsule; Refill: 2  - patient advised to RTC in 1 month for follow-up    2. Anemia, unspecified type  - CBC Auto Differential; Future    3. Screening for diabetes mellitus (DM)  - Hemoglobin A1C; Future    4. Screening, lipid  - Lipid Panel; Future    5. Screening for thyroid disorder  - TSH; Future    6. Electrolyte imbalance risk  - Comprehensive Metabolic Panel; Future      Provided patient with anticipatory guidance and return precautions. Treatment plan discussed with patient, all questions answered, and patient acknowledged understanding and verbal agreement.      Follow-up in: 1 month; or sooner PRN if acute concerns arise.      Case discussed with Dr. ANGE Guaman MD, MPH  [facility] Family Medicine PGY-2  05/08/2024        The following information is provided to all patients.  This information is to help you find resources for any of the problems found today that may be affecting your health:                Living healthy guide: www.[facility].[place].gov       Understanding Diabetes: www.diabetes.org       Eating healthy: www.cdc.gov/healthyweight      CDC home safety checklist: www.cdc.gov/steadi/patient.html      Agency on Aging: www.goea.[place].gov       Alcoholics anonymous (AA): www.aa.org      Physical Activity: www.vidhi.nih.gov/lo2ojkj       Tobacco use: www.quitwithusla.org

## 2024-06-05 NOTE — PROGRESS NOTES
I assume primary medical responsibility for this patient. I have reviewed the history, physical, and assessement & treatment plan with the resident and agree that the care is reasonable and necessary. This service has been performed by a resident with the presence of a teaching physician for the key parts of the history/exam. If necessary, an addendum of additional findings or evaluation beyond the resident documentation will be noted below.     Twyla Larson MD

## 2024-07-22 ENCOUNTER — HOSPITAL ENCOUNTER (EMERGENCY)
Facility: HOSPITAL | Age: 44
Discharge: HOME | End: 2024-07-22
Attending: EMERGENCY MEDICINE

## 2024-07-22 VITALS
TEMPERATURE: 98 F | OXYGEN SATURATION: 100 % | HEART RATE: 82 BPM | HEIGHT: 60 IN | SYSTOLIC BLOOD PRESSURE: 122 MMHG | DIASTOLIC BLOOD PRESSURE: 80 MMHG | BODY MASS INDEX: 26.32 KG/M2 | WEIGHT: 134.06 LBS | RESPIRATION RATE: 18 BRPM

## 2024-07-22 DIAGNOSIS — R11.0 NAUSEA: ICD-10-CM

## 2024-07-22 DIAGNOSIS — R53.83 FATIGUE, UNSPECIFIED TYPE: Primary | ICD-10-CM

## 2024-07-22 LAB
ALBUMIN SERPL BCP-MCNC: 3.9 G/DL (ref 3.5–5.2)
ALP SERPL-CCNC: 55 U/L (ref 55–135)
ALT SERPL W/O P-5'-P-CCNC: 8 U/L (ref 10–44)
ANION GAP SERPL CALC-SCNC: 10 MMOL/L (ref 8–16)
AST SERPL-CCNC: 18 U/L (ref 10–40)
B-HCG UR QL: NEGATIVE
BASOPHILS # BLD AUTO: 0.02 K/UL (ref 0–0.2)
BASOPHILS NFR BLD: 0.4 % (ref 0–1.9)
BILIRUB SERPL-MCNC: 1.1 MG/DL (ref 0.1–1)
BILIRUB UR QL STRIP: NEGATIVE
BUN SERPL-MCNC: 11 MG/DL (ref 6–20)
CALCIUM SERPL-MCNC: 9 MG/DL (ref 8.7–10.5)
CHLORIDE SERPL-SCNC: 105 MMOL/L (ref 95–110)
CLARITY UR: CLEAR
CO2 SERPL-SCNC: 22 MMOL/L (ref 23–29)
COLOR UR: YELLOW
CREAT SERPL-MCNC: 0.6 MG/DL (ref 0.5–1.4)
CTP QC/QA: YES
DIFFERENTIAL METHOD BLD: ABNORMAL
EOSINOPHIL # BLD AUTO: 0 K/UL (ref 0–0.5)
EOSINOPHIL NFR BLD: 0.8 % (ref 0–8)
ERYTHROCYTE [DISTWIDTH] IN BLOOD BY AUTOMATED COUNT: 15.1 % (ref 11.5–14.5)
EST. GFR  (NO RACE VARIABLE): >60 ML/MIN/1.73 M^2
GLUCOSE SERPL-MCNC: 84 MG/DL (ref 70–110)
GLUCOSE UR QL STRIP: NEGATIVE
HCT VFR BLD AUTO: 40.7 % (ref 37–48.5)
HGB BLD-MCNC: 12.5 G/DL (ref 12–16)
HGB UR QL STRIP: NEGATIVE
IMM GRANULOCYTES # BLD AUTO: 0.01 K/UL (ref 0–0.04)
IMM GRANULOCYTES NFR BLD AUTO: 0.2 % (ref 0–0.5)
KETONES UR QL STRIP: ABNORMAL
LEUKOCYTE ESTERASE UR QL STRIP: NEGATIVE
LIPASE SERPL-CCNC: 20 U/L (ref 4–60)
LYMPHOCYTES # BLD AUTO: 2.3 K/UL (ref 1–4.8)
LYMPHOCYTES NFR BLD: 44.4 % (ref 18–48)
MCH RBC QN AUTO: 21.8 PG (ref 27–31)
MCHC RBC AUTO-ENTMCNC: 30.7 G/DL (ref 32–36)
MCV RBC AUTO: 71 FL (ref 82–98)
MONOCYTES # BLD AUTO: 0.6 K/UL (ref 0.3–1)
MONOCYTES NFR BLD: 11.2 % (ref 4–15)
NEUTROPHILS # BLD AUTO: 2.2 K/UL (ref 1.8–7.7)
NEUTROPHILS NFR BLD: 43 % (ref 38–73)
NITRITE UR QL STRIP: NEGATIVE
NRBC BLD-RTO: 0 /100 WBC
PH UR STRIP: 7 [PH] (ref 5–8)
PLATELET # BLD AUTO: 335 K/UL (ref 150–450)
PMV BLD AUTO: 10 FL (ref 9.2–12.9)
POTASSIUM SERPL-SCNC: 4.9 MMOL/L (ref 3.5–5.1)
PROT SERPL-MCNC: 6.9 G/DL (ref 6–8.4)
PROT UR QL STRIP: NEGATIVE
RBC # BLD AUTO: 5.74 M/UL (ref 4–5.4)
SARS-COV-2 RDRP RESP QL NAA+PROBE: NEGATIVE
SODIUM SERPL-SCNC: 137 MMOL/L (ref 136–145)
SP GR UR STRIP: 1.02 (ref 1–1.03)
URN SPEC COLLECT METH UR: ABNORMAL
UROBILINOGEN UR STRIP-ACNC: NEGATIVE EU/DL
WBC # BLD AUTO: 5.11 K/UL (ref 3.9–12.7)

## 2024-07-22 PROCEDURE — 81003 URINALYSIS AUTO W/O SCOPE: CPT

## 2024-07-22 PROCEDURE — 81025 URINE PREGNANCY TEST: CPT

## 2024-07-22 PROCEDURE — 85025 COMPLETE CBC W/AUTO DIFF WBC: CPT

## 2024-07-22 PROCEDURE — 25000003 PHARM REV CODE 250

## 2024-07-22 PROCEDURE — 96360 HYDRATION IV INFUSION INIT: CPT

## 2024-07-22 PROCEDURE — 83690 ASSAY OF LIPASE: CPT

## 2024-07-22 PROCEDURE — 80053 COMPREHEN METABOLIC PANEL: CPT

## 2024-07-22 PROCEDURE — U0002 COVID-19 LAB TEST NON-CDC: HCPCS

## 2024-07-22 PROCEDURE — 99284 EMERGENCY DEPT VISIT MOD MDM: CPT | Mod: 25

## 2024-07-22 RX ORDER — ONDANSETRON 4 MG/1
4 TABLET, ORALLY DISINTEGRATING ORAL 2 TIMES DAILY
Qty: 10 TABLET | Refills: 0 | Status: SHIPPED | OUTPATIENT
Start: 2024-07-22 | End: 2024-07-27

## 2024-07-22 RX ORDER — ONDANSETRON 4 MG/1
4 TABLET, ORALLY DISINTEGRATING ORAL
Status: DISCONTINUED | OUTPATIENT
Start: 2024-07-22 | End: 2024-07-22 | Stop reason: HOSPADM

## 2024-07-22 RX ADMIN — SODIUM CHLORIDE 1000 ML: 9 INJECTION, SOLUTION INTRAVENOUS at 03:07

## 2024-07-22 NOTE — DISCHARGE INSTRUCTIONS
Thank you for letting me care for you today - it was nice to meet you and I hope you feel better soon. Please follow up with your primary care provider within the next week for follow up care.     I sent in the following meds:   Zofran: You can take this up to 2 times per day, as needed for nausea     Our goal at [facility] is to always give you outstanding care and exceptional service. You may receive a survey by mail or email in the next week about your experience in our ED. We would greatly appreciate you completing and returning the survey. Your feedback provides us with a way to recognize our staff who give very good care and it helps us learn how to improve when your experience was below our aspiration of excellence.     All the best,     Jeanne Thurston, MPH, PA-C  Emergency Department Physician Assistant  [facility]

## 2024-07-22 NOTE — ED PROVIDER NOTES
Encounter Date: 7/22/2024       History     Chief Complaint   Patient presents with    Nausea    Weakness     Pt c/o nausea, generalized weakness, x1wk PMH anxiety, gerd     Patient is a 43 y.o. female who presents to the ED for evaluation of generalized fatigue and nausea X 1 week. Patient has taken nexium for symptom relief.  Possible COVID exposure. Denies fever, chills, abdominal pain, vomiting, diarrhea, constipation, urinary problems, joint problems, rashes, or any other complaints at this time.      The history is provided by the patient.     Review of patient's allergies indicates:  No Known Allergies  No past medical history on file.  No past surgical history on file.  Family History   Problem Relation Name Age of Onset    Hypertension Mother      Heart disease Father      Stroke Father      Hypertension Father       Social History     Tobacco Use    Smoking status: Never    Smokeless tobacco: Never   Substance Use Topics    Alcohol use: No    Drug use: No     Review of Systems   Constitutional:  Positive for fatigue. Negative for chills and fever.   HENT:  Negative for congestion and sore throat.    Respiratory:  Negative for shortness of breath and wheezing.    Cardiovascular:  Negative for chest pain.   Gastrointestinal:  Positive for nausea. Negative for abdominal distention, abdominal pain, constipation, diarrhea and vomiting.   Genitourinary:  Negative for dysuria.   Musculoskeletal:  Negative for back pain.   Skin:  Negative for pallor and rash.   Neurological:  Negative for weakness and light-headedness.   Hematological:  Does not bruise/bleed easily.       Physical Exam     Initial Vitals [07/22/24 1328]   BP Pulse Resp Temp SpO2   136/86 77 18 98.2 °F (36.8 °C) 100 %      MAP       --         Physical Exam    Constitutional: She appears well-developed and well-nourished.   HENT:   Head: Normocephalic and atraumatic.   Mucous membranes moist, no evidence of dehydration   Cardiovascular:  Normal  rate.           Abdominal: Bowel sounds are normal. She exhibits no distension. There is no abdominal tenderness.   Benign abdominal exam with no focal tenderness. There is no rebound and no guarding.     Neurological: She is alert.         ED Course   Procedures  Labs Reviewed   URINALYSIS, REFLEX TO URINE CULTURE - Abnormal       Result Value    Specimen UA Urine, Clean Catch      Color, UA Yellow      Appearance, UA Clear      pH, UA 7.0      Specific Gravity, UA 1.020      Protein, UA Negative      Glucose, UA Negative      Ketones, UA 1+ (*)     Bilirubin (UA) Negative      Occult Blood UA Negative      Nitrite, UA Negative      Urobilinogen, UA Negative      Leukocytes, UA Negative      Narrative:     Specimen Source->Urine   CBC W/ AUTO DIFFERENTIAL - Abnormal    WBC 5.11      RBC 5.74 (*)     Hemoglobin 12.5      Hematocrit 40.7      MCV 71 (*)     MCH 21.8 (*)     MCHC 30.7 (*)     RDW 15.1 (*)     Platelets 335      MPV 10.0      Immature Granulocytes 0.2      Gran # (ANC) 2.2      Immature Grans (Abs) 0.01      Lymph # 2.3      Mono # 0.6      Eos # 0.0      Baso # 0.02      nRBC 0      Gran % 43.0      Lymph % 44.4      Mono % 11.2      Eosinophil % 0.8      Basophil % 0.4      Differential Method Automated     COMPREHENSIVE METABOLIC PANEL - Abnormal    Sodium 137      Potassium 4.9      Chloride 105      CO2 22 (*)     Glucose 84      BUN 11      Creatinine 0.6      Calcium 9.0      Total Protein 6.9      Albumin 3.9      Total Bilirubin 1.1 (*)     Alkaline Phosphatase 55      AST 18      ALT 8 (*)     eGFR >60      Anion Gap 10     SARS-COV-2 RNA AMPLIFICATION, QUAL    SARS-CoV-2 RNA, Amplification, Qual Negative     LIPASE    Lipase 20     POCT URINE PREGNANCY    POC Preg Test, Ur Negative       Acceptable Yes            Imaging Results    None          Medications   sodium chloride 0.9% bolus 1,000 mL 1,000 mL (0 mLs Intravenous Stopped 7/22/24 3257)     Medical Decision  Making  Patient is an afebrile, well appearing 43 y.o. female. VSS and do not suggest sepsis.  Denies chest pain, SOB, or any other complaints at this time. A&Ox4. The patient remained comfortable and stable during their visit in the ED. Details of ED course documented in ED workup.     Differential diagnosis includes, but is not limited to: Viral gastroenteritis, dehydration, electrolyte abnormality,  enteritis, COVID, flu, UTI    All historical, clinical, radiographic, and laboratory findings reviewed. There are no concerning features on physical exam to suggest an emergent or life threatening condition. No further intervention is indicated at this time and I am of the belief that that it is safe to discharge the patient from the emergency department.     Patient has been counseled regarding the need for follow-up as well as the indications to return to the emergency room should new or worrisome developments (including but not limited to worsening pain, cyanosis, or loss of strength or sensation) occur. Additionally, patient instructed to follow up with PCP in 2-3 days for recheck of today's complaints.    Discharge and follow-up instructions discussed with the patient who expressed understanding and willingness to comply with recommendations. Patient discharged from the emergency department in stable condition, in no acute distress.    Amount and/or Complexity of Data Reviewed  Labs: ordered. Decision-making details documented in ED Course.    Risk  Prescription drug management.               ED Course as of 07/23/24 2138 Mon Jul 22, 2024   1457 Patient examined and assessed. Patient answering questions appropriately, speaking in complete sentences, respirations are even and unlabored.  AAO x 4.  [OB]   1533 SARS-CoV-2 RNA, Amplification, Qual: Negative [OB]   1533 hCG Qualitative, Urine: Negative [OB]   1552 WBC: 5.11  No leukocytosis [OB]   1552 Hemoglobin: 12.5 [OB]   1552 Hematocrit: 40.7  H&H stable [OB]    1552 Lipase and CMP still need to be collected.  Sent message to RN for update. [OB]   1719 Lipase: 20 [OB]   1725 Lipase: 20  WNL [OB]   1729 Patient tolerating PO. States that she is feeling better. Will d/c with return precautions and PCP f/u.  [OB]      ED Course User Index  [OB] Jeanne Thurston PA-C                           Clinical Impression:  Final diagnoses:  [R53.83] Fatigue, unspecified type (Primary)  [R11.0] Nausea          ED Disposition Condition    Discharge Stable          ED Prescriptions       Medication Sig Dispense Start Date End Date Auth. Provider    ondansetron (ZOFRAN-ODT) 4 MG TbDL Take 1 tablet (4 mg total) by mouth 2 (two) times daily. for 5 days 10 tablet 7/22/2024 7/27/2024 Jeanne Thurston PA-C          Follow-up Information    None          Jeanne Thurston PA-C  07/23/24 9836

## 2024-07-22 NOTE — ED NOTES
Pt presents to ED with C/O weakness and intermittent nauseas with onset x1 week. Pt also C/O intermittent HA and states she has been taking Tylenol and ibuprofen. She states last dose of Ibuprofen was last night at 10:00 PM. Pt also states dry cough.

## 2024-07-22 NOTE — ED TRIAGE NOTES
C/o generalized weakness with intermittent nausea x 1 week. Adds that she does spend a lot of time outdoors and does not think she drinks enough fluids. + intermittent headache. Taking Tylenol and Ibuprofen with some relief. No fever, vomiting, cough/congestion. Presents awake, alert.

## 2024-08-16 DIAGNOSIS — F32.A ANXIETY AND DEPRESSION: ICD-10-CM

## 2024-08-16 DIAGNOSIS — F41.9 ANXIETY AND DEPRESSION: ICD-10-CM

## 2024-08-19 RX ORDER — VENLAFAXINE HYDROCHLORIDE 37.5 MG/1
37.5 CAPSULE, EXTENDED RELEASE ORAL DAILY
Qty: 30 CAPSULE | Refills: 2 | Status: SHIPPED | OUTPATIENT
Start: 2024-08-19 | End: 2024-11-17

## 2025-01-29 DIAGNOSIS — F41.9 ANXIETY AND DEPRESSION: ICD-10-CM

## 2025-01-29 DIAGNOSIS — F32.A ANXIETY AND DEPRESSION: ICD-10-CM

## 2025-01-29 RX ORDER — VENLAFAXINE HYDROCHLORIDE 37.5 MG/1
37.5 CAPSULE, EXTENDED RELEASE ORAL DAILY
Qty: 30 CAPSULE | Refills: 2 | Status: SHIPPED | OUTPATIENT
Start: 2025-01-29 | End: 2025-04-29

## 2025-06-24 ENCOUNTER — TELEPHONE (OUTPATIENT)
Dept: PRIMARY CARE CLINIC | Facility: CLINIC | Age: 45
End: 2025-06-24
Payer: COMMERCIAL

## 2025-06-25 DIAGNOSIS — F32.A ANXIETY AND DEPRESSION: Primary | ICD-10-CM

## 2025-06-25 DIAGNOSIS — F41.9 ANXIETY AND DEPRESSION: Primary | ICD-10-CM

## 2025-06-25 RX ORDER — VENLAFAXINE HYDROCHLORIDE 37.5 MG/1
37.5 CAPSULE, EXTENDED RELEASE ORAL DAILY
Qty: 30 CAPSULE | Refills: 0 | Status: SHIPPED | OUTPATIENT
Start: 2025-06-25 | End: 2025-07-25

## 2025-06-25 NOTE — PROGRESS NOTES
Venlafaxine refilled.  Patient overdue for clinic appointment.  Will send patient message.      1. Anxiety and depression (Primary)  - venlafaxine (EFFEXOR-XR) 37.5 MG 24 hr capsule; Take 1 capsule (37.5 mg total) by mouth once daily.  Dispense: 30 capsule; Refill: 0        Jessenia Guaman MD, MPH  [facility] Family Medicine PGY-3  06/25/2025

## 2025-07-01 DIAGNOSIS — F41.9 ANXIETY AND DEPRESSION: ICD-10-CM

## 2025-07-01 DIAGNOSIS — F32.A ANXIETY AND DEPRESSION: ICD-10-CM

## 2025-07-01 RX ORDER — VENLAFAXINE HYDROCHLORIDE 37.5 MG/1
37.5 CAPSULE, EXTENDED RELEASE ORAL DAILY
Qty: 30 CAPSULE | Refills: 0 | OUTPATIENT
Start: 2025-07-01 | End: 2025-07-31